# Patient Record
Sex: FEMALE | Race: ASIAN | NOT HISPANIC OR LATINO | Employment: UNEMPLOYED | URBAN - METROPOLITAN AREA
[De-identification: names, ages, dates, MRNs, and addresses within clinical notes are randomized per-mention and may not be internally consistent; named-entity substitution may affect disease eponyms.]

---

## 2018-08-11 ENCOUNTER — APPOINTMENT (OUTPATIENT)
Dept: RADIOLOGY | Facility: MEDICAL CENTER | Age: 48
DRG: 964 | End: 2018-08-11
Payer: OTHER MISCELLANEOUS

## 2018-08-11 ENCOUNTER — HOSPITAL ENCOUNTER (INPATIENT)
Facility: MEDICAL CENTER | Age: 48
LOS: 7 days | DRG: 964 | End: 2018-08-19
Attending: EMERGENCY MEDICINE | Admitting: SURGERY
Payer: OTHER MISCELLANEOUS

## 2018-08-11 ENCOUNTER — RESOLUTE PROFESSIONAL BILLING HOSPITAL PROF FEE (OUTPATIENT)
Dept: HOSPITALIST | Facility: MEDICAL CENTER | Age: 48
End: 2018-08-11
Payer: OTHER MISCELLANEOUS

## 2018-08-11 PROCEDURE — 85610 PROTHROMBIN TIME: CPT

## 2018-08-11 PROCEDURE — 84703 CHORIONIC GONADOTROPIN ASSAY: CPT

## 2018-08-11 PROCEDURE — 80053 COMPREHEN METABOLIC PANEL: CPT

## 2018-08-11 PROCEDURE — 80307 DRUG TEST PRSMV CHEM ANLYZR: CPT

## 2018-08-11 PROCEDURE — 99291 CRITICAL CARE FIRST HOUR: CPT

## 2018-08-11 PROCEDURE — 85730 THROMBOPLASTIN TIME PARTIAL: CPT

## 2018-08-11 PROCEDURE — 85027 COMPLETE CBC AUTOMATED: CPT

## 2018-08-12 ENCOUNTER — HOSPITAL ENCOUNTER (OUTPATIENT)
Dept: RADIOLOGY | Facility: MEDICAL CENTER | Age: 48
End: 2018-08-12
Attending: EMERGENCY MEDICINE

## 2018-08-12 ENCOUNTER — APPOINTMENT (OUTPATIENT)
Dept: RADIOLOGY | Facility: MEDICAL CENTER | Age: 48
DRG: 964 | End: 2018-08-12
Attending: SURGERY
Payer: OTHER MISCELLANEOUS

## 2018-08-12 PROBLEM — S27.322A BILATERAL PULMONARY CONTUSION: Status: ACTIVE | Noted: 2018-08-12

## 2018-08-12 PROBLEM — T14.90XA TRAUMA: Status: ACTIVE | Noted: 2018-08-12

## 2018-08-12 PROBLEM — S27.0XXA TRAUMATIC PNEUMOTHORAX: Status: ACTIVE | Noted: 2018-08-12

## 2018-08-12 PROBLEM — I30.9 PERICARDIAL EFFUSION, ACUTE: Status: ACTIVE | Noted: 2018-08-12

## 2018-08-12 PROBLEM — S02.2XXA CLOSED FRACTURE OF NASAL BONE: Status: ACTIVE | Noted: 2018-08-12

## 2018-08-12 PROBLEM — S36.113A LIVER INJURY, LACERATION: Status: ACTIVE | Noted: 2018-08-12

## 2018-08-12 PROBLEM — S22.41XA FRACTURE OF MULTIPLE RIBS OF RIGHT SIDE: Status: ACTIVE | Noted: 2018-08-12

## 2018-08-12 PROBLEM — Z53.09 CONTRAINDICATION TO DEEP VEIN THROMBOSIS (DVT) PROPHYLAXIS: Status: ACTIVE | Noted: 2018-08-12

## 2018-08-12 PROBLEM — I31.2: Status: ACTIVE | Noted: 2018-08-12

## 2018-08-12 PROBLEM — S27.2XXA TRAUMATIC HEMOPNEUMOTHORAX: Status: ACTIVE | Noted: 2018-08-12

## 2018-08-12 PROBLEM — S22.41XA CLOSED FRACTURE OF MULTIPLE RIBS OF RIGHT SIDE: Status: ACTIVE | Noted: 2018-08-12

## 2018-08-12 LAB
ABO GROUP BLD: NORMAL
ABO GROUP BLD: NORMAL
ALBUMIN SERPL BCP-MCNC: 3.8 G/DL (ref 3.2–4.9)
ALBUMIN SERPL BCP-MCNC: 4.4 G/DL (ref 3.2–4.9)
ALBUMIN/GLOB SERPL: 1.6 G/DL
ALBUMIN/GLOB SERPL: 1.8 G/DL
ALP SERPL-CCNC: 59 U/L (ref 30–99)
ALP SERPL-CCNC: 67 U/L (ref 30–99)
ALT SERPL-CCNC: 532 U/L (ref 2–50)
ALT SERPL-CCNC: 617 U/L (ref 2–50)
ANION GAP SERPL CALC-SCNC: 8 MMOL/L (ref 0–11.9)
ANION GAP SERPL CALC-SCNC: 9 MMOL/L (ref 0–11.9)
APTT PPP: 30 SEC (ref 24.7–36)
AST SERPL-CCNC: 444 U/L (ref 12–45)
AST SERPL-CCNC: 586 U/L (ref 12–45)
BASOPHILS # BLD AUTO: 0.3 % (ref 0–1.8)
BASOPHILS # BLD AUTO: 0.5 % (ref 0–1.8)
BASOPHILS # BLD: 0.02 K/UL (ref 0–0.12)
BASOPHILS # BLD: 0.05 K/UL (ref 0–0.12)
BILIRUB SERPL-MCNC: 0.3 MG/DL (ref 0.1–1.5)
BILIRUB SERPL-MCNC: 0.4 MG/DL (ref 0.1–1.5)
BLD GP AB SCN SERPL QL: NORMAL
BUN SERPL-MCNC: 17 MG/DL (ref 8–22)
BUN SERPL-MCNC: 19 MG/DL (ref 8–22)
CALCIUM SERPL-MCNC: 8.3 MG/DL (ref 8.5–10.5)
CALCIUM SERPL-MCNC: 8.7 MG/DL (ref 8.5–10.5)
CFT BLD TEG: 4.8 MIN (ref 5–10)
CHLORIDE SERPL-SCNC: 104 MMOL/L (ref 96–112)
CHLORIDE SERPL-SCNC: 107 MMOL/L (ref 96–112)
CLOT ANGLE BLD TEG: 67.6 DEGREES (ref 53–72)
CLOT LYSIS 30M P MA LENFR BLD TEG: 0.2 % (ref 0–8)
CO2 SERPL-SCNC: 26 MMOL/L (ref 20–33)
CO2 SERPL-SCNC: 27 MMOL/L (ref 20–33)
CREAT SERPL-MCNC: 0.6 MG/DL (ref 0.5–1.4)
CREAT SERPL-MCNC: 0.72 MG/DL (ref 0.5–1.4)
CT.EXTRINSIC BLD ROTEM: 1.7 MIN (ref 1–3)
EOSINOPHIL # BLD AUTO: 0 K/UL (ref 0–0.51)
EOSINOPHIL # BLD AUTO: 0.01 K/UL (ref 0–0.51)
EOSINOPHIL NFR BLD: 0 % (ref 0–6.9)
EOSINOPHIL NFR BLD: 0.1 % (ref 0–6.9)
ERYTHROCYTE [DISTWIDTH] IN BLOOD BY AUTOMATED COUNT: 42.8 FL (ref 35.9–50)
ERYTHROCYTE [DISTWIDTH] IN BLOOD BY AUTOMATED COUNT: 43.6 FL (ref 35.9–50)
ERYTHROCYTE [DISTWIDTH] IN BLOOD BY AUTOMATED COUNT: 44.4 FL (ref 35.9–50)
ETHANOL BLD-MCNC: 0 G/DL
GLOBULIN SER CALC-MCNC: 2.4 G/DL (ref 1.9–3.5)
GLOBULIN SER CALC-MCNC: 2.5 G/DL (ref 1.9–3.5)
GLUCOSE BLD-MCNC: 131 MG/DL (ref 65–99)
GLUCOSE BLD-MCNC: 145 MG/DL (ref 65–99)
GLUCOSE BLD-MCNC: 149 MG/DL (ref 65–99)
GLUCOSE SERPL-MCNC: 139 MG/DL (ref 65–99)
GLUCOSE SERPL-MCNC: 163 MG/DL (ref 65–99)
HCG SERPL QL: NEGATIVE
HCT VFR BLD AUTO: 34.4 % (ref 37–47)
HCT VFR BLD AUTO: 37.7 % (ref 37–47)
HCT VFR BLD AUTO: 38.3 % (ref 37–47)
HGB BLD-MCNC: 10.7 G/DL (ref 12–16)
HGB BLD-MCNC: 10.7 G/DL (ref 12–16)
HGB BLD-MCNC: 11 G/DL (ref 12–16)
HGB BLD-MCNC: 12.1 G/DL (ref 12–16)
HGB BLD-MCNC: 12.5 G/DL (ref 12–16)
IMM GRANULOCYTES # BLD AUTO: 0.03 K/UL (ref 0–0.11)
IMM GRANULOCYTES # BLD AUTO: 0.04 K/UL (ref 0–0.11)
IMM GRANULOCYTES NFR BLD AUTO: 0.4 % (ref 0–0.9)
IMM GRANULOCYTES NFR BLD AUTO: 0.4 % (ref 0–0.9)
INR PPP: 0.95 (ref 0.87–1.13)
LYMPHOCYTES # BLD AUTO: 0.65 K/UL (ref 1–4.8)
LYMPHOCYTES # BLD AUTO: 1.31 K/UL (ref 1–4.8)
LYMPHOCYTES NFR BLD: 12.2 % (ref 22–41)
LYMPHOCYTES NFR BLD: 8.3 % (ref 22–41)
MCF BLD TEG: 59.8 MM (ref 50–70)
MCH RBC QN AUTO: 29 PG (ref 27–33)
MCH RBC QN AUTO: 29.2 PG (ref 27–33)
MCH RBC QN AUTO: 29.4 PG (ref 27–33)
MCHC RBC AUTO-ENTMCNC: 32 G/DL (ref 33.6–35)
MCHC RBC AUTO-ENTMCNC: 32.1 G/DL (ref 33.6–35)
MCHC RBC AUTO-ENTMCNC: 32.6 G/DL (ref 33.6–35)
MCV RBC AUTO: 89.5 FL (ref 81.4–97.8)
MCV RBC AUTO: 90.4 FL (ref 81.4–97.8)
MCV RBC AUTO: 92 FL (ref 81.4–97.8)
MONOCYTES # BLD AUTO: 0.42 K/UL (ref 0–0.85)
MONOCYTES # BLD AUTO: 0.48 K/UL (ref 0–0.85)
MONOCYTES NFR BLD AUTO: 4.5 % (ref 0–13.4)
MONOCYTES NFR BLD AUTO: 5.4 % (ref 0–13.4)
NEUTROPHILS # BLD AUTO: 6.73 K/UL (ref 2–7.15)
NEUTROPHILS # BLD AUTO: 8.88 K/UL (ref 2–7.15)
NEUTROPHILS NFR BLD: 82.3 % (ref 44–72)
NEUTROPHILS NFR BLD: 85.6 % (ref 44–72)
NRBC # BLD AUTO: 0 K/UL
NRBC # BLD AUTO: 0 K/UL
NRBC BLD-RTO: 0 /100 WBC
NRBC BLD-RTO: 0 /100 WBC
PA AA BLD-ACNC: 46.9 %
PA ADP BLD-ACNC: 37 %
PLATELET # BLD AUTO: 202 K/UL (ref 164–446)
PLATELET # BLD AUTO: 238 K/UL (ref 164–446)
PLATELET # BLD AUTO: 238 K/UL (ref 164–446)
PMV BLD AUTO: 9 FL (ref 9–12.9)
PMV BLD AUTO: 9 FL (ref 9–12.9)
PMV BLD AUTO: 9.2 FL (ref 9–12.9)
POTASSIUM SERPL-SCNC: 3.7 MMOL/L (ref 3.6–5.5)
POTASSIUM SERPL-SCNC: 4.1 MMOL/L (ref 3.6–5.5)
PROT SERPL-MCNC: 6.2 G/DL (ref 6–8.2)
PROT SERPL-MCNC: 6.9 G/DL (ref 6–8.2)
PROTHROMBIN TIME: 12.4 SEC (ref 12–14.6)
RBC # BLD AUTO: 3.74 M/UL (ref 4.2–5.4)
RBC # BLD AUTO: 4.17 M/UL (ref 4.2–5.4)
RBC # BLD AUTO: 4.28 M/UL (ref 4.2–5.4)
RH BLD: NORMAL
RH BLD: NORMAL
SODIUM SERPL-SCNC: 140 MMOL/L (ref 135–145)
SODIUM SERPL-SCNC: 141 MMOL/L (ref 135–145)
TEG ALGORITHM TGALG: ABNORMAL
WBC # BLD AUTO: 10.2 K/UL (ref 4.8–10.8)
WBC # BLD AUTO: 10.8 K/UL (ref 4.8–10.8)
WBC # BLD AUTO: 7.9 K/UL (ref 4.8–10.8)

## 2018-08-12 PROCEDURE — 86900 BLOOD TYPING SEROLOGIC ABO: CPT

## 2018-08-12 PROCEDURE — 72128 CT CHEST SPINE W/O DYE: CPT

## 2018-08-12 PROCEDURE — 72170 X-RAY EXAM OF PELVIS: CPT

## 2018-08-12 PROCEDURE — 700102 HCHG RX REV CODE 250 W/ 637 OVERRIDE(OP): Performed by: SURGERY

## 2018-08-12 PROCEDURE — 96374 THER/PROPH/DIAG INJ IV PUSH: CPT

## 2018-08-12 PROCEDURE — 72125 CT NECK SPINE W/O DYE: CPT

## 2018-08-12 PROCEDURE — 94668 MNPJ CHEST WALL SBSQ: CPT

## 2018-08-12 PROCEDURE — 93306 TTE W/DOPPLER COMPLETE: CPT | Mod: 26 | Performed by: INTERNAL MEDICINE

## 2018-08-12 PROCEDURE — 700111 HCHG RX REV CODE 636 W/ 250 OVERRIDE (IP): Performed by: EMERGENCY MEDICINE

## 2018-08-12 PROCEDURE — 70450 CT HEAD/BRAIN W/O DYE: CPT

## 2018-08-12 PROCEDURE — 700111 HCHG RX REV CODE 636 W/ 250 OVERRIDE (IP)

## 2018-08-12 PROCEDURE — 99291 CRITICAL CARE FIRST HOUR: CPT | Performed by: SURGERY

## 2018-08-12 PROCEDURE — 85025 COMPLETE CBC W/AUTO DIFF WBC: CPT

## 2018-08-12 PROCEDURE — A9270 NON-COVERED ITEM OR SERVICE: HCPCS | Performed by: SURGERY

## 2018-08-12 PROCEDURE — 82962 GLUCOSE BLOOD TEST: CPT | Mod: 91

## 2018-08-12 PROCEDURE — 85347 COAGULATION TIME ACTIVATED: CPT

## 2018-08-12 PROCEDURE — 71045 X-RAY EXAM CHEST 1 VIEW: CPT

## 2018-08-12 PROCEDURE — 72131 CT LUMBAR SPINE W/O DYE: CPT

## 2018-08-12 PROCEDURE — 700105 HCHG RX REV CODE 258: Performed by: SURGERY

## 2018-08-12 PROCEDURE — 770022 HCHG ROOM/CARE - ICU (200)

## 2018-08-12 PROCEDURE — 700117 HCHG RX CONTRAST REV CODE 255: Performed by: EMERGENCY MEDICINE

## 2018-08-12 PROCEDURE — 700111 HCHG RX REV CODE 636 W/ 250 OVERRIDE (IP): Performed by: SURGERY

## 2018-08-12 PROCEDURE — 70486 CT MAXILLOFACIAL W/O DYE: CPT

## 2018-08-12 PROCEDURE — 700101 HCHG RX REV CODE 250: Performed by: SURGERY

## 2018-08-12 PROCEDURE — 85576 BLOOD PLATELET AGGREGATION: CPT | Mod: 91

## 2018-08-12 PROCEDURE — 51798 US URINE CAPACITY MEASURE: CPT

## 2018-08-12 PROCEDURE — 80053 COMPREHEN METABOLIC PANEL: CPT

## 2018-08-12 PROCEDURE — 86901 BLOOD TYPING SEROLOGIC RH(D): CPT

## 2018-08-12 PROCEDURE — 94667 MNPJ CHEST WALL 1ST: CPT

## 2018-08-12 PROCEDURE — 71260 CT THORAX DX C+: CPT

## 2018-08-12 PROCEDURE — 96375 TX/PRO/DX INJ NEW DRUG ADDON: CPT

## 2018-08-12 PROCEDURE — 86850 RBC ANTIBODY SCREEN: CPT

## 2018-08-12 PROCEDURE — 85384 FIBRINOGEN ACTIVITY: CPT

## 2018-08-12 PROCEDURE — 85018 HEMOGLOBIN: CPT

## 2018-08-12 RX ORDER — ONDANSETRON 2 MG/ML
4 INJECTION INTRAMUSCULAR; INTRAVENOUS EVERY 4 HOURS PRN
Status: DISCONTINUED | OUTPATIENT
Start: 2018-08-12 | End: 2018-08-19 | Stop reason: HOSPADM

## 2018-08-12 RX ORDER — BACITRACIN ZINC AND POLYMYXIN B SULFATE 500; 1000 [USP'U]/G; [USP'U]/G
OINTMENT TOPICAL 3 TIMES DAILY
Status: DISCONTINUED | OUTPATIENT
Start: 2018-08-12 | End: 2018-08-19 | Stop reason: HOSPADM

## 2018-08-12 RX ORDER — GABAPENTIN 100 MG/1
100 CAPSULE ORAL EVERY 8 HOURS
Status: DISCONTINUED | OUTPATIENT
Start: 2018-08-12 | End: 2018-08-19 | Stop reason: HOSPADM

## 2018-08-12 RX ORDER — ENEMA 19; 7 G/133ML; G/133ML
1 ENEMA RECTAL
Status: DISCONTINUED | OUTPATIENT
Start: 2018-08-12 | End: 2018-08-19 | Stop reason: HOSPADM

## 2018-08-12 RX ORDER — MORPHINE SULFATE 4 MG/ML
4 INJECTION, SOLUTION INTRAMUSCULAR; INTRAVENOUS ONCE
Status: COMPLETED | OUTPATIENT
Start: 2018-08-12 | End: 2018-08-12

## 2018-08-12 RX ORDER — SODIUM CHLORIDE, SODIUM LACTATE, POTASSIUM CHLORIDE, CALCIUM CHLORIDE 600; 310; 30; 20 MG/100ML; MG/100ML; MG/100ML; MG/100ML
INJECTION, SOLUTION INTRAVENOUS CONTINUOUS
Status: DISCONTINUED | OUTPATIENT
Start: 2018-08-12 | End: 2018-08-14

## 2018-08-12 RX ORDER — DEXTROSE MONOHYDRATE 25 G/50ML
25 INJECTION, SOLUTION INTRAVENOUS
Status: DISCONTINUED | OUTPATIENT
Start: 2018-08-12 | End: 2018-08-14

## 2018-08-12 RX ORDER — AMOXICILLIN 250 MG
1 CAPSULE ORAL NIGHTLY
Status: DISCONTINUED | OUTPATIENT
Start: 2018-08-12 | End: 2018-08-19 | Stop reason: HOSPADM

## 2018-08-12 RX ORDER — FAMOTIDINE 20 MG/1
20 TABLET, FILM COATED ORAL 2 TIMES DAILY
Status: DISCONTINUED | OUTPATIENT
Start: 2018-08-12 | End: 2018-08-12

## 2018-08-12 RX ORDER — BISACODYL 10 MG
10 SUPPOSITORY, RECTAL RECTAL
Status: DISCONTINUED | OUTPATIENT
Start: 2018-08-12 | End: 2018-08-19 | Stop reason: HOSPADM

## 2018-08-12 RX ORDER — POLYETHYLENE GLYCOL 3350 17 G/17G
1 POWDER, FOR SOLUTION ORAL 2 TIMES DAILY
Status: DISCONTINUED | OUTPATIENT
Start: 2018-08-12 | End: 2018-08-19 | Stop reason: HOSPADM

## 2018-08-12 RX ORDER — AMOXICILLIN 250 MG
1 CAPSULE ORAL
Status: DISCONTINUED | OUTPATIENT
Start: 2018-08-12 | End: 2018-08-19 | Stop reason: HOSPADM

## 2018-08-12 RX ORDER — DOCUSATE SODIUM 100 MG/1
100 CAPSULE, LIQUID FILLED ORAL 2 TIMES DAILY
Status: DISCONTINUED | OUTPATIENT
Start: 2018-08-12 | End: 2018-08-19 | Stop reason: HOSPADM

## 2018-08-12 RX ORDER — ACETAMINOPHEN 650 MG/1
650 SUPPOSITORY RECTAL EVERY 4 HOURS PRN
Status: DISCONTINUED | OUTPATIENT
Start: 2018-08-12 | End: 2018-08-12

## 2018-08-12 RX ORDER — OXYCODONE HYDROCHLORIDE 10 MG/1
10 TABLET ORAL
Status: DISCONTINUED | OUTPATIENT
Start: 2018-08-12 | End: 2018-08-19 | Stop reason: HOSPADM

## 2018-08-12 RX ORDER — ACETAMINOPHEN 325 MG/1
650 TABLET ORAL EVERY 4 HOURS PRN
Status: DISCONTINUED | OUTPATIENT
Start: 2018-08-12 | End: 2018-08-12

## 2018-08-12 RX ORDER — OXYCODONE HYDROCHLORIDE 5 MG/1
5 TABLET ORAL
Status: DISCONTINUED | OUTPATIENT
Start: 2018-08-12 | End: 2018-08-19 | Stop reason: HOSPADM

## 2018-08-12 RX ORDER — ACETAMINOPHEN 325 MG/1
650 TABLET ORAL EVERY 6 HOURS
Status: DISCONTINUED | OUTPATIENT
Start: 2018-08-12 | End: 2018-08-12

## 2018-08-12 RX ORDER — CELECOXIB 200 MG/1
200 CAPSULE ORAL 2 TIMES DAILY WITH MEALS
Status: DISCONTINUED | OUTPATIENT
Start: 2018-08-12 | End: 2018-08-12

## 2018-08-12 RX ADMIN — PROCHLORPERAZINE EDISYLATE 10 MG: 5 INJECTION INTRAMUSCULAR; INTRAVENOUS at 16:35

## 2018-08-12 RX ADMIN — MORPHINE SULFATE 4 MG: 4 INJECTION INTRAVENOUS at 01:29

## 2018-08-12 RX ADMIN — GABAPENTIN 100 MG: 100 CAPSULE ORAL at 21:10

## 2018-08-12 RX ADMIN — IOHEXOL 100 ML: 350 INJECTION, SOLUTION INTRAVENOUS at 00:49

## 2018-08-12 RX ADMIN — FENTANYL CITRATE 50 MCG: 50 INJECTION, SOLUTION INTRAMUSCULAR; INTRAVENOUS at 00:06

## 2018-08-12 RX ADMIN — SODIUM CHLORIDE, POTASSIUM CHLORIDE, SODIUM LACTATE AND CALCIUM CHLORIDE: 600; 310; 30; 20 INJECTION, SOLUTION INTRAVENOUS at 02:30

## 2018-08-12 RX ADMIN — GABAPENTIN 100 MG: 100 CAPSULE ORAL at 14:27

## 2018-08-12 RX ADMIN — ONDANSETRON 4 MG: 2 INJECTION, SOLUTION INTRAMUSCULAR; INTRAVENOUS at 15:32

## 2018-08-12 RX ADMIN — FENTANYL CITRATE 50 MCG: 50 INJECTION, SOLUTION INTRAMUSCULAR; INTRAVENOUS at 07:10

## 2018-08-12 RX ADMIN — Medication 1 TABLET: at 21:10

## 2018-08-12 RX ADMIN — ONDANSETRON 4 MG: 2 INJECTION, SOLUTION INTRAMUSCULAR; INTRAVENOUS at 08:59

## 2018-08-12 RX ADMIN — OXYCODONE HYDROCHLORIDE 5 MG: 5 TABLET ORAL at 21:10

## 2018-08-12 RX ADMIN — ONDANSETRON 4 MG: 2 INJECTION, SOLUTION INTRAMUSCULAR; INTRAVENOUS at 03:16

## 2018-08-12 RX ADMIN — SODIUM CHLORIDE, POTASSIUM CHLORIDE, SODIUM LACTATE AND CALCIUM CHLORIDE: 600; 310; 30; 20 INJECTION, SOLUTION INTRAVENOUS at 12:02

## 2018-08-12 RX ADMIN — OXYCODONE HYDROCHLORIDE 5 MG: 5 TABLET ORAL at 11:31

## 2018-08-12 RX ADMIN — OXYCODONE HYDROCHLORIDE 5 MG: 5 TABLET ORAL at 16:20

## 2018-08-12 ASSESSMENT — PAIN SCALES - GENERAL
PAINLEVEL_OUTOF10: 5
PAINLEVEL_OUTOF10: 4
PAINLEVEL_OUTOF10: 4
PAINLEVEL_OUTOF10: 5
PAINLEVEL_OUTOF10: 3
PAINLEVEL_OUTOF10: 5
PAINLEVEL_OUTOF10: 4
PAINLEVEL_OUTOF10: 4
PAINLEVEL_OUTOF10: 5
PAINLEVEL_OUTOF10: 5
PAINLEVEL_OUTOF10: 7
PAINLEVEL_OUTOF10: 4
PAINLEVEL_OUTOF10: 6
PAINLEVEL_OUTOF10: 6
PAINLEVEL_OUTOF10: 7

## 2018-08-12 ASSESSMENT — LIFESTYLE VARIABLES
EVER_SMOKED: NEVER
ALCOHOL_USE: NO

## 2018-08-12 ASSESSMENT — COPD QUESTIONNAIRES
COPD SCREENING SCORE: 1
HAVE YOU SMOKED AT LEAST 100 CIGARETTES IN YOUR ENTIRE LIFE: NO/DON'T KNOW
DURING THE PAST 4 WEEKS HOW MUCH DID YOU FEEL SHORT OF BREATH: NONE/LITTLE OF THE TIME
IN THE PAST 12 MONTHS DO YOU DO LESS THAN YOU USED TO BECAUSE OF YOUR BREATHING PROBLEMS: DISAGREE/UNSURE
HAVE YOU SMOKED AT LEAST 100 CIGARETTES IN YOUR ENTIRE LIFE: NO/DON'T KNOW
DO YOU EVER COUGH UP ANY MUCUS OR PHLEGM?: NO/ONLY WITH OCCASIONAL COLDS OR INFECTIONS
DURING THE PAST 4 WEEKS HOW MUCH DID YOU FEEL SHORT OF BREATH: NONE/LITTLE OF THE TIME
DO YOU EVER COUGH UP ANY MUCUS OR PHLEGM?: NO/ONLY WITH OCCASIONAL COLDS OR INFECTIONS

## 2018-08-12 ASSESSMENT — COGNITIVE AND FUNCTIONAL STATUS - GENERAL
SUGGESTED CMS G CODE MODIFIER DAILY ACTIVITY: CH
SUGGESTED CMS G CODE MODIFIER MOBILITY: CI
TURNING FROM BACK TO SIDE WHILE IN FLAT BAD: A LITTLE
MOBILITY SCORE: 23
DAILY ACTIVITIY SCORE: 24

## 2018-08-12 ASSESSMENT — PATIENT HEALTH QUESTIONNAIRE - PHQ9
SUM OF ALL RESPONSES TO PHQ9 QUESTIONS 1 AND 2: 0
1. LITTLE INTEREST OR PLEASURE IN DOING THINGS: NOT AT ALL
2. FEELING DOWN, DEPRESSED, IRRITABLE, OR HOPELESS: NOT AT ALL
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
## 2018-08-12 NOTE — ED NOTES
ICU MD at bedside, states that C collar is to remain on patient and C spine precautions to remain in place.
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
## 2018-08-12 NOTE — CARE PLAN
Problem: Safety  Goal: Will remain free from falls  Outcome: PROGRESSING AS EXPECTED  Pt remains free from falls throughout hospitalization - bed locked and in lowest position, treaded socks and appropriate rails in use, bed alarm on and call bell within reach. Pt rounded on hourly to address any needs.    Problem: Pain Management  Goal: Pain level will decrease to patient's comfort goal  Outcome: PROGRESSING AS EXPECTED  PRN medications in use for pain management, tolerated well by pt.
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
## 2018-08-13 ENCOUNTER — APPOINTMENT (OUTPATIENT)
Dept: RADIOLOGY | Facility: MEDICAL CENTER | Age: 48
DRG: 964 | End: 2018-08-13
Attending: SURGERY
Payer: OTHER MISCELLANEOUS

## 2018-08-13 LAB
ALBUMIN SERPL BCP-MCNC: 3.4 G/DL (ref 3.2–4.9)
ALBUMIN/GLOB SERPL: 1.6 G/DL
ALP SERPL-CCNC: 46 U/L (ref 30–99)
ALT SERPL-CCNC: 309 U/L (ref 2–50)
ANION GAP SERPL CALC-SCNC: 6 MMOL/L (ref 0–11.9)
AST SERPL-CCNC: 137 U/L (ref 12–45)
BASOPHILS # BLD AUTO: 0.5 % (ref 0–1.8)
BASOPHILS # BLD: 0.03 K/UL (ref 0–0.12)
BILIRUB SERPL-MCNC: 0.7 MG/DL (ref 0.1–1.5)
BUN SERPL-MCNC: 10 MG/DL (ref 8–22)
CALCIUM SERPL-MCNC: 8.1 MG/DL (ref 8.5–10.5)
CHLORIDE SERPL-SCNC: 107 MMOL/L (ref 96–112)
CO2 SERPL-SCNC: 26 MMOL/L (ref 20–33)
CREAT SERPL-MCNC: 0.48 MG/DL (ref 0.5–1.4)
EOSINOPHIL # BLD AUTO: 0.02 K/UL (ref 0–0.51)
EOSINOPHIL NFR BLD: 0.3 % (ref 0–6.9)
ERYTHROCYTE [DISTWIDTH] IN BLOOD BY AUTOMATED COUNT: 45.1 FL (ref 35.9–50)
GLOBULIN SER CALC-MCNC: 2.1 G/DL (ref 1.9–3.5)
GLUCOSE BLD-MCNC: 110 MG/DL (ref 65–99)
GLUCOSE BLD-MCNC: 114 MG/DL (ref 65–99)
GLUCOSE BLD-MCNC: 120 MG/DL (ref 65–99)
GLUCOSE BLD-MCNC: 123 MG/DL (ref 65–99)
GLUCOSE SERPL-MCNC: 129 MG/DL (ref 65–99)
HCT VFR BLD AUTO: 31 % (ref 37–47)
HGB BLD-MCNC: 10.1 G/DL (ref 12–16)
IMM GRANULOCYTES # BLD AUTO: 0.02 K/UL (ref 0–0.11)
IMM GRANULOCYTES NFR BLD AUTO: 0.3 % (ref 0–0.9)
LV EJECT FRACT  99904: 70
LV EJECT FRACT MOD 2C 99903: 74.81
LV EJECT FRACT MOD 4C 99902: 82.54
LV EJECT FRACT MOD BP 99901: 77.91
LYMPHOCYTES # BLD AUTO: 1.14 K/UL (ref 1–4.8)
LYMPHOCYTES NFR BLD: 19.9 % (ref 22–41)
MAGNESIUM SERPL-MCNC: 1.9 MG/DL (ref 1.5–2.5)
MCH RBC QN AUTO: 30 PG (ref 27–33)
MCHC RBC AUTO-ENTMCNC: 32.6 G/DL (ref 33.6–35)
MCV RBC AUTO: 92 FL (ref 81.4–97.8)
MONOCYTES # BLD AUTO: 0.39 K/UL (ref 0–0.85)
MONOCYTES NFR BLD AUTO: 6.8 % (ref 0–13.4)
NEUTROPHILS # BLD AUTO: 4.14 K/UL (ref 2–7.15)
NEUTROPHILS NFR BLD: 72.2 % (ref 44–72)
NRBC # BLD AUTO: 0 K/UL
NRBC BLD-RTO: 0 /100 WBC
PHOSPHATE SERPL-MCNC: 3 MG/DL (ref 2.5–4.5)
PLATELET # BLD AUTO: 153 K/UL (ref 164–446)
PMV BLD AUTO: 9.4 FL (ref 9–12.9)
POTASSIUM SERPL-SCNC: 4 MMOL/L (ref 3.6–5.5)
PROT SERPL-MCNC: 5.5 G/DL (ref 6–8.2)
RBC # BLD AUTO: 3.37 M/UL (ref 4.2–5.4)
SODIUM SERPL-SCNC: 139 MMOL/L (ref 135–145)
WBC # BLD AUTO: 5.7 K/UL (ref 4.8–10.8)

## 2018-08-13 PROCEDURE — 99291 CRITICAL CARE FIRST HOUR: CPT | Performed by: SURGERY

## 2018-08-13 PROCEDURE — 770022 HCHG ROOM/CARE - ICU (200)

## 2018-08-13 PROCEDURE — 83735 ASSAY OF MAGNESIUM: CPT

## 2018-08-13 PROCEDURE — A9270 NON-COVERED ITEM OR SERVICE: HCPCS | Performed by: SURGERY

## 2018-08-13 PROCEDURE — 700105 HCHG RX REV CODE 258: Performed by: SURGERY

## 2018-08-13 PROCEDURE — 700112 HCHG RX REV CODE 229: Performed by: SURGERY

## 2018-08-13 PROCEDURE — 93306 TTE W/DOPPLER COMPLETE: CPT

## 2018-08-13 PROCEDURE — 85025 COMPLETE CBC W/AUTO DIFF WBC: CPT

## 2018-08-13 PROCEDURE — 71045 X-RAY EXAM CHEST 1 VIEW: CPT

## 2018-08-13 PROCEDURE — 93970 EXTREMITY STUDY: CPT | Mod: 26 | Performed by: SURGERY

## 2018-08-13 PROCEDURE — 94668 MNPJ CHEST WALL SBSQ: CPT

## 2018-08-13 PROCEDURE — 700102 HCHG RX REV CODE 250 W/ 637 OVERRIDE(OP): Performed by: SURGERY

## 2018-08-13 PROCEDURE — 84100 ASSAY OF PHOSPHORUS: CPT

## 2018-08-13 PROCEDURE — 82962 GLUCOSE BLOOD TEST: CPT

## 2018-08-13 PROCEDURE — 700101 HCHG RX REV CODE 250: Performed by: SURGERY

## 2018-08-13 PROCEDURE — 80053 COMPREHEN METABOLIC PANEL: CPT

## 2018-08-13 PROCEDURE — 93971 EXTREMITY STUDY: CPT

## 2018-08-13 RX ORDER — ACETAMINOPHEN 325 MG/1
650 TABLET ORAL EVERY 6 HOURS
Status: DISCONTINUED | OUTPATIENT
Start: 2018-08-13 | End: 2018-08-19 | Stop reason: HOSPADM

## 2018-08-13 RX ADMIN — ACETAMINOPHEN 650 MG: 325 TABLET, FILM COATED ORAL at 13:20

## 2018-08-13 RX ADMIN — Medication: at 18:00

## 2018-08-13 RX ADMIN — Medication 1 TABLET: at 21:30

## 2018-08-13 RX ADMIN — GABAPENTIN 100 MG: 100 CAPSULE ORAL at 13:19

## 2018-08-13 RX ADMIN — DOCUSATE SODIUM 100 MG: 100 CAPSULE, LIQUID FILLED ORAL at 05:16

## 2018-08-13 RX ADMIN — OXYCODONE HYDROCHLORIDE 5 MG: 5 TABLET ORAL at 09:01

## 2018-08-13 RX ADMIN — GABAPENTIN 100 MG: 100 CAPSULE ORAL at 23:23

## 2018-08-13 RX ADMIN — Medication 1 EACH: at 05:16

## 2018-08-13 RX ADMIN — ACETAMINOPHEN 650 MG: 325 TABLET, FILM COATED ORAL at 17:59

## 2018-08-13 RX ADMIN — OXYCODONE HYDROCHLORIDE 5 MG: 5 TABLET ORAL at 05:16

## 2018-08-13 RX ADMIN — SODIUM CHLORIDE, POTASSIUM CHLORIDE, SODIUM LACTATE AND CALCIUM CHLORIDE: 600; 310; 30; 20 INJECTION, SOLUTION INTRAVENOUS at 07:56

## 2018-08-13 RX ADMIN — Medication 1 EACH: at 13:20

## 2018-08-13 RX ADMIN — OXYCODONE HYDROCHLORIDE 5 MG: 5 TABLET ORAL at 17:59

## 2018-08-13 RX ADMIN — GABAPENTIN 100 MG: 100 CAPSULE ORAL at 05:16

## 2018-08-13 RX ADMIN — ACETAMINOPHEN 650 MG: 325 TABLET, FILM COATED ORAL at 23:24

## 2018-08-13 ASSESSMENT — PAIN SCALES - GENERAL
PAINLEVEL_OUTOF10: 5
PAINLEVEL_OUTOF10: 5
PAINLEVEL_OUTOF10: 3
PAINLEVEL_OUTOF10: 5
PAINLEVEL_OUTOF10: 3

## 2018-08-13 ASSESSMENT — PATIENT HEALTH QUESTIONNAIRE - PHQ9
1. LITTLE INTEREST OR PLEASURE IN DOING THINGS: NOT AT ALL
2. FEELING DOWN, DEPRESSED, IRRITABLE, OR HOPELESS: NOT AT ALL
SUM OF ALL RESPONSES TO PHQ9 QUESTIONS 1 AND 2: 0

## 2018-08-13 NOTE — CARE PLAN
Problem: Safety  Goal: Will remain free from injury  Bed in low and locked position. All alarm set appropriately, on and running.    Problem: Pain Management  Goal: Pain level will decrease to patient's comfort goal  Will continue to monitor pain throughout this shift. PRN meds to be given if needed    Problem: Skin Integrity  Goal: Risk for impaired skin integrity will decrease  Turn q2h to prevent skin breakdown.  Skin assessed.
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
## 2018-08-14 ENCOUNTER — APPOINTMENT (OUTPATIENT)
Dept: RADIOLOGY | Facility: MEDICAL CENTER | Age: 48
DRG: 964 | End: 2018-08-14
Attending: SURGERY
Payer: OTHER MISCELLANEOUS

## 2018-08-14 LAB
ALBUMIN SERPL BCP-MCNC: 3.3 G/DL (ref 3.2–4.9)
ALBUMIN/GLOB SERPL: 1.2 G/DL
ALP SERPL-CCNC: 49 U/L (ref 30–99)
ALT SERPL-CCNC: 206 U/L (ref 2–50)
ANION GAP SERPL CALC-SCNC: 4 MMOL/L (ref 0–11.9)
AST SERPL-CCNC: 68 U/L (ref 12–45)
BASOPHILS # BLD AUTO: 0.5 % (ref 0–1.8)
BASOPHILS # BLD: 0.03 K/UL (ref 0–0.12)
BILIRUB SERPL-MCNC: 0.8 MG/DL (ref 0.1–1.5)
BUN SERPL-MCNC: 9 MG/DL (ref 8–22)
CALCIUM SERPL-MCNC: 8.3 MG/DL (ref 8.5–10.5)
CHLORIDE SERPL-SCNC: 102 MMOL/L (ref 96–112)
CO2 SERPL-SCNC: 31 MMOL/L (ref 20–33)
CREAT SERPL-MCNC: 0.51 MG/DL (ref 0.5–1.4)
EOSINOPHIL # BLD AUTO: 0.03 K/UL (ref 0–0.51)
EOSINOPHIL NFR BLD: 0.5 % (ref 0–6.9)
ERYTHROCYTE [DISTWIDTH] IN BLOOD BY AUTOMATED COUNT: 43.5 FL (ref 35.9–50)
GLOBULIN SER CALC-MCNC: 2.7 G/DL (ref 1.9–3.5)
GLUCOSE BLD-MCNC: 107 MG/DL (ref 65–99)
GLUCOSE SERPL-MCNC: 108 MG/DL (ref 65–99)
HCT VFR BLD AUTO: 30.8 % (ref 37–47)
HGB BLD-MCNC: 10.1 G/DL (ref 12–16)
HGB RETIC QN AUTO: 31.1 PG/CELL (ref 29–35)
IMM GRANULOCYTES # BLD AUTO: 0.01 K/UL (ref 0–0.11)
IMM GRANULOCYTES NFR BLD AUTO: 0.2 % (ref 0–0.9)
IMM RETICS NFR: 6.3 % (ref 9.3–17.4)
IRON SATN MFR SERPL: 12 % (ref 15–55)
IRON SERPL-MCNC: 31 UG/DL (ref 40–170)
LYMPHOCYTES # BLD AUTO: 1.45 K/UL (ref 1–4.8)
LYMPHOCYTES NFR BLD: 25.3 % (ref 22–41)
MCH RBC QN AUTO: 29.8 PG (ref 27–33)
MCHC RBC AUTO-ENTMCNC: 32.8 G/DL (ref 33.6–35)
MCV RBC AUTO: 90.9 FL (ref 81.4–97.8)
MONOCYTES # BLD AUTO: 0.36 K/UL (ref 0–0.85)
MONOCYTES NFR BLD AUTO: 6.3 % (ref 0–13.4)
NEUTROPHILS # BLD AUTO: 3.84 K/UL (ref 2–7.15)
NEUTROPHILS NFR BLD: 67.2 % (ref 44–72)
NRBC # BLD AUTO: 0 K/UL
NRBC BLD-RTO: 0 /100 WBC
PLATELET # BLD AUTO: 164 K/UL (ref 164–446)
PMV BLD AUTO: 9.6 FL (ref 9–12.9)
POTASSIUM SERPL-SCNC: 4 MMOL/L (ref 3.6–5.5)
PROT SERPL-MCNC: 6 G/DL (ref 6–8.2)
RBC # BLD AUTO: 3.39 M/UL (ref 4.2–5.4)
RETICS # AUTO: 0.04 M/UL (ref 0.04–0.06)
RETICS/RBC NFR: 1.1 % (ref 0.8–2.1)
SODIUM SERPL-SCNC: 137 MMOL/L (ref 135–145)
TIBC SERPL-MCNC: 260 UG/DL (ref 250–450)
WBC # BLD AUTO: 5.7 K/UL (ref 4.8–10.8)

## 2018-08-14 PROCEDURE — 83550 IRON BINDING TEST: CPT

## 2018-08-14 PROCEDURE — A9270 NON-COVERED ITEM OR SERVICE: HCPCS | Performed by: SURGERY

## 2018-08-14 PROCEDURE — 85046 RETICYTE/HGB CONCENTRATE: CPT

## 2018-08-14 PROCEDURE — 71045 X-RAY EXAM CHEST 1 VIEW: CPT

## 2018-08-14 PROCEDURE — 700102 HCHG RX REV CODE 250 W/ 637 OVERRIDE(OP): Performed by: SURGERY

## 2018-08-14 PROCEDURE — 700111 HCHG RX REV CODE 636 W/ 250 OVERRIDE (IP): Performed by: SURGERY

## 2018-08-14 PROCEDURE — 83540 ASSAY OF IRON: CPT

## 2018-08-14 PROCEDURE — 82962 GLUCOSE BLOOD TEST: CPT

## 2018-08-14 PROCEDURE — 700112 HCHG RX REV CODE 229: Performed by: SURGERY

## 2018-08-14 PROCEDURE — 94668 MNPJ CHEST WALL SBSQ: CPT

## 2018-08-14 PROCEDURE — 99291 CRITICAL CARE FIRST HOUR: CPT | Performed by: SURGERY

## 2018-08-14 PROCEDURE — 80053 COMPREHEN METABOLIC PANEL: CPT

## 2018-08-14 PROCEDURE — 700101 HCHG RX REV CODE 250: Performed by: SURGERY

## 2018-08-14 PROCEDURE — 770001 HCHG ROOM/CARE - MED/SURG/GYN PRIV*

## 2018-08-14 PROCEDURE — 85025 COMPLETE CBC W/AUTO DIFF WBC: CPT

## 2018-08-14 RX ADMIN — ONDANSETRON 4 MG: 2 INJECTION, SOLUTION INTRAMUSCULAR; INTRAVENOUS at 23:07

## 2018-08-14 RX ADMIN — DOCUSATE SODIUM 100 MG: 100 CAPSULE, LIQUID FILLED ORAL at 05:28

## 2018-08-14 RX ADMIN — GABAPENTIN 100 MG: 100 CAPSULE ORAL at 05:28

## 2018-08-14 RX ADMIN — Medication 1 EACH: at 05:37

## 2018-08-14 RX ADMIN — Medication 1 EACH: at 11:51

## 2018-08-14 RX ADMIN — ACETAMINOPHEN 650 MG: 325 TABLET, FILM COATED ORAL at 17:32

## 2018-08-14 RX ADMIN — POLYETHYLENE GLYCOL 3350 1 PACKET: 17 POWDER, FOR SOLUTION ORAL at 17:33

## 2018-08-14 RX ADMIN — OXYCODONE HYDROCHLORIDE 5 MG: 5 TABLET ORAL at 21:33

## 2018-08-14 RX ADMIN — DOCUSATE SODIUM 100 MG: 100 CAPSULE, LIQUID FILLED ORAL at 17:32

## 2018-08-14 RX ADMIN — Medication 1 EACH: at 17:33

## 2018-08-14 RX ADMIN — GABAPENTIN 100 MG: 100 CAPSULE ORAL at 13:59

## 2018-08-14 RX ADMIN — GABAPENTIN 100 MG: 100 CAPSULE ORAL at 21:33

## 2018-08-14 RX ADMIN — ACETAMINOPHEN 650 MG: 325 TABLET, FILM COATED ORAL at 11:51

## 2018-08-14 RX ADMIN — ENOXAPARIN SODIUM 30 MG: 100 INJECTION SUBCUTANEOUS at 17:32

## 2018-08-14 RX ADMIN — POLYETHYLENE GLYCOL 3350 1 PACKET: 17 POWDER, FOR SOLUTION ORAL at 05:33

## 2018-08-14 RX ADMIN — IRON SUCROSE 200 MG: 20 INJECTION, SOLUTION INTRAVENOUS at 14:00

## 2018-08-14 RX ADMIN — ACETAMINOPHEN 650 MG: 325 TABLET, FILM COATED ORAL at 05:28

## 2018-08-14 RX ADMIN — Medication 1 TABLET: at 21:33

## 2018-08-14 RX ADMIN — ENOXAPARIN SODIUM 30 MG: 100 INJECTION SUBCUTANEOUS at 05:33

## 2018-08-14 RX ADMIN — OXYCODONE HYDROCHLORIDE 2.5 MG: 5 TABLET ORAL at 11:50

## 2018-08-14 ASSESSMENT — PAIN SCALES - GENERAL
PAINLEVEL_OUTOF10: 2
PAINLEVEL_OUTOF10: 3
PAINLEVEL_OUTOF10: 2
PAINLEVEL_OUTOF10: 6
PAINLEVEL_OUTOF10: 4
PAINLEVEL_OUTOF10: 5
PAINLEVEL_OUTOF10: 4
PAINLEVEL_OUTOF10: 4
PAINLEVEL_OUTOF10: 5
PAINLEVEL_OUTOF10: 6
PAINLEVEL_OUTOF10: 4
PAINLEVEL_OUTOF10: 4
PAINLEVEL_OUTOF10: 2

## 2018-08-14 NOTE — CARE PLAN
Problem: Venous Thromboembolism (VTW)/Deep Vein Thrombosis (DVT) Prevention:  Goal: Patient will participate in Venous Thrombosis (VTE)/Deep Vein Thrombosis (DVT)Prevention Measures    Intervention: Ensure patient wears graduated elastic stockings (FISH hose) and/or SCDs, if ordered, when in bed or chair (Remove at least once per shift for skin check)  No s/s of DVT, receiving Lovenox,  has sequentials on and ambulates to commode.        Problem: Respiratory:  Goal: Respiratory status will improve    Intervention: Educate and encourage coughing and deep breathing  IS at bedside, education provided for IS, coughing and deep breathing. IS at 800-1000, education provided to use 10/hr.

## 2018-08-14 NOTE — PROGRESS NOTES
IV Iron Per Pharmacy Note    Reason for Iron Replacement: Iron deficiency anemia      Lab Results   Component Value Date/Time    WBC 5.7 08/14/2018 05:30 AM    RBC 3.39 (L) 08/14/2018 05:30 AM    HEMOGLOBIN 10.1 (L) 08/14/2018 05:30 AM    HEMATOCRIT 30.8 (L) 08/14/2018 05:30 AM    MCV 90.9 08/14/2018 05:30 AM    MCH 29.8 08/14/2018 05:30 AM    MCHC 32.8 (L) 08/14/2018 05:30 AM    MPV 9.6 08/14/2018 05:30 AM       Recent Labs      08/14/18   0530   IRON  31*         Recent Labs      08/14/18   0530   CREATININE  0.51          Assessment/Plan:  1. IV Iron Indicated.   2. Give Iron Sucrose 200mg IV daily for 5 days to complete iron load.     Ronan Dyson, PharmD

## 2018-08-14 NOTE — DISCHARGE PLANNING
Care Transition Team Assessment    LSW spoke with pt's  (Kapil) over the phone and obtained the information used in this assessment. Pt verified accuracy of facesheet. Pt lives in an apartment with her  on the first floor.  Pt does not have a pharmacy. Prior to current hospitalization, pt was completely independent in ADLS/IADLS. Pt drives and is able to attend necessary MD appointments. Pt is employed full time and has no financial concerns. Pt has good support system. Pt denies any hx of substance use and denies any dx of mh.     Pt's  updated this LSW that he us already on the road  and will be gone until Friday 8/24 and that he has the keys to their home. LSW stated that if pt were too d/c prior to Friday, we would call and update him so an alternative plan can be made.    Information Source  Orientation : Oriented x 4  Information Given By: Spouse  Informant's Name:  (Kapil)  Who is responsible for making decisions for patient? : Patient    Readmission Evaluation  Is this a readmission?: No    Elopement Risk  Legal Hold: No  Ambulatory or Self Mobile in Wheelchair: No-Not an Elopement Risk  Elopement Risk: Not at Risk for Elopement    Interdisciplinary Discharge Planning  Patient or legal guardian wants to designate a caregiver (see row info): No    Discharge Preparedness  What is your plan after discharge?: Uncertain - pending medical team collaboration, Home with help, Home health care  What are your discharge supports?: Spouse  Prior Functional Level: Ambulatory, Drives Self, Independent with Activities of Daily Living, Independent with Medication Management  Difficulity with ADLs: Bathing, Dressing, Walking  Difficulity with IADLs: Cooking, Driving, Laundry, Shopping    Functional Assesment  Prior Functional Level: Ambulatory, Drives Self, Independent with Activities of Daily Living, Independent with Medication Management    Finances  Financial Barriers to Discharge:  No  Prescription Coverage: Yes    Vision / Hearing Impairment  Vision Impairment : No  Hearing Impairment : No    Values / Beliefs / Concerns  Values / Beliefs Concerns : No (pt in [facility])    Advance Directive  Advance Directive?: None    Domestic Abuse  Have you ever been the victim of abuse or violence?: No  Physical Abuse or Sexual Abuse: No  Verbal Abuse or Emotional Abuse: No  Possible Abuse Reported to:: Not Applicable    Psychological Assessment  History of Substance Abuse: None  History of Psychiatric Problems: No  Non-compliant with Treatment: No  Newly Diagnosed Illness: Yes    Discharge Risks or Barriers  Discharge risks or barriers?: Uninsured / underinsured, Post-acute placement / services  Patient risk factors: Complex medical needs, Language barrier, Uninsured or underinsured    Anticipated Discharge Information  Anticipated discharge disposition: Acute rehab, Discharge needs currently unknown, DME, HHC, SNF
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
## 2018-08-14 NOTE — DISCHARGE PLANNING
Anticipated Discharge Disposition: SNF vs Rehab    Action: LSW received call from Keila ROBLES CM with [employer] [phone] who is pt's insurance provider. Keila stated that pt has a [place] insurance and [facility] is out of network and she was wondering if pt would like to be brought back to [place]. LSW explained that she believed pt was a resident of [place] but would verify that information. LSW called and spoke to pt's  Kapil who stated that pt has a couple stores in [place] but that pt lives in [place] full time and does not want to return. LSW explained about [place] insurance and Kapil stated that he is working on changing that. LSW called Keila back and left  to provide update on pt's POC.    Barriers to Discharge: Out of network insurance     Plan: Waiting returned call from Keila ROBLES CM.

## 2018-08-14 NOTE — CARE PLAN
Problem: Safety  Goal: Will remain free from falls  Outcome: PROGRESSING AS EXPECTED  Patient bed in low, locked position with side rails up x2. Patient wearing treaded slipper socks. Call bell and personal belongings in reach. Patient uses call bed appropriately.     Problem: Pain Management  Goal: Pain level will decrease to patient's comfort goal  Outcome: PROGRESSING AS EXPECTED  Assessing pain q2hrs. Assisting patient to rest and reposition for comfort. Providing pain meds per MAR as needed.

## 2018-08-14 NOTE — CARE PLAN
Problem: Hyperinflation:  Goal: Prevent or improve atelectasis    Intervention: Instruct incentive spirometry usage  PEP QID  60% 1560  Best Is value today 1000

## 2018-08-15 ENCOUNTER — APPOINTMENT (OUTPATIENT)
Dept: RADIOLOGY | Facility: MEDICAL CENTER | Age: 48
DRG: 964 | End: 2018-08-15
Attending: SURGERY
Payer: OTHER MISCELLANEOUS

## 2018-08-15 LAB
ALBUMIN SERPL BCP-MCNC: 3.3 G/DL (ref 3.2–4.9)
ALBUMIN/GLOB SERPL: 1.2 G/DL
ALP SERPL-CCNC: 59 U/L (ref 30–99)
ALT SERPL-CCNC: 165 U/L (ref 2–50)
ANION GAP SERPL CALC-SCNC: 5 MMOL/L (ref 0–11.9)
AST SERPL-CCNC: 55 U/L (ref 12–45)
BASOPHILS # BLD AUTO: 1.3 % (ref 0–1.8)
BASOPHILS # BLD: 0.06 K/UL (ref 0–0.12)
BILIRUB SERPL-MCNC: 0.5 MG/DL (ref 0.1–1.5)
BUN SERPL-MCNC: 10 MG/DL (ref 8–22)
CALCIUM SERPL-MCNC: 8.6 MG/DL (ref 8.5–10.5)
CHLORIDE SERPL-SCNC: 103 MMOL/L (ref 96–112)
CO2 SERPL-SCNC: 30 MMOL/L (ref 20–33)
CREAT SERPL-MCNC: 0.6 MG/DL (ref 0.5–1.4)
EOSINOPHIL # BLD AUTO: 0.03 K/UL (ref 0–0.51)
EOSINOPHIL NFR BLD: 0.6 % (ref 0–6.9)
ERYTHROCYTE [DISTWIDTH] IN BLOOD BY AUTOMATED COUNT: 42.5 FL (ref 35.9–50)
GLOBULIN SER CALC-MCNC: 2.7 G/DL (ref 1.9–3.5)
GLUCOSE SERPL-MCNC: 112 MG/DL (ref 65–99)
HCT VFR BLD AUTO: 31.2 % (ref 37–47)
HGB BLD-MCNC: 10.2 G/DL (ref 12–16)
IMM GRANULOCYTES # BLD AUTO: 0.01 K/UL (ref 0–0.11)
IMM GRANULOCYTES NFR BLD AUTO: 0.2 % (ref 0–0.9)
LYMPHOCYTES # BLD AUTO: 1.36 K/UL (ref 1–4.8)
LYMPHOCYTES NFR BLD: 29.2 % (ref 22–41)
MCH RBC QN AUTO: 29.7 PG (ref 27–33)
MCHC RBC AUTO-ENTMCNC: 32.7 G/DL (ref 33.6–35)
MCV RBC AUTO: 91 FL (ref 81.4–97.8)
MONOCYTES # BLD AUTO: 0.3 K/UL (ref 0–0.85)
MONOCYTES NFR BLD AUTO: 6.5 % (ref 0–13.4)
NEUTROPHILS # BLD AUTO: 2.89 K/UL (ref 2–7.15)
NEUTROPHILS NFR BLD: 62.2 % (ref 44–72)
NRBC # BLD AUTO: 0 K/UL
NRBC BLD-RTO: 0 /100 WBC
PLATELET # BLD AUTO: 175 K/UL (ref 164–446)
PMV BLD AUTO: 9.4 FL (ref 9–12.9)
POTASSIUM SERPL-SCNC: 4 MMOL/L (ref 3.6–5.5)
PROT SERPL-MCNC: 6 G/DL (ref 6–8.2)
RBC # BLD AUTO: 3.43 M/UL (ref 4.2–5.4)
SODIUM SERPL-SCNC: 138 MMOL/L (ref 135–145)
WBC # BLD AUTO: 4.7 K/UL (ref 4.8–10.8)

## 2018-08-15 PROCEDURE — 80053 COMPREHEN METABOLIC PANEL: CPT

## 2018-08-15 PROCEDURE — 700101 HCHG RX REV CODE 250: Performed by: SURGERY

## 2018-08-15 PROCEDURE — 770001 HCHG ROOM/CARE - MED/SURG/GYN PRIV*

## 2018-08-15 PROCEDURE — G8978 MOBILITY CURRENT STATUS: HCPCS | Mod: CI

## 2018-08-15 PROCEDURE — 97161 PT EVAL LOW COMPLEX 20 MIN: CPT

## 2018-08-15 PROCEDURE — G8979 MOBILITY GOAL STATUS: HCPCS | Mod: CI

## 2018-08-15 PROCEDURE — G8980 MOBILITY D/C STATUS: HCPCS | Mod: CI

## 2018-08-15 PROCEDURE — 700111 HCHG RX REV CODE 636 W/ 250 OVERRIDE (IP): Performed by: SURGERY

## 2018-08-15 PROCEDURE — 71045 X-RAY EXAM CHEST 1 VIEW: CPT

## 2018-08-15 PROCEDURE — 700102 HCHG RX REV CODE 250 W/ 637 OVERRIDE(OP): Performed by: SURGERY

## 2018-08-15 PROCEDURE — 85025 COMPLETE CBC W/AUTO DIFF WBC: CPT

## 2018-08-15 PROCEDURE — A9270 NON-COVERED ITEM OR SERVICE: HCPCS | Performed by: SURGERY

## 2018-08-15 PROCEDURE — 99233 SBSQ HOSP IP/OBS HIGH 50: CPT | Performed by: SURGERY

## 2018-08-15 PROCEDURE — 700112 HCHG RX REV CODE 229: Performed by: SURGERY

## 2018-08-15 RX ADMIN — ACETAMINOPHEN 650 MG: 325 TABLET, FILM COATED ORAL at 13:00

## 2018-08-15 RX ADMIN — POLYETHYLENE GLYCOL 3350 1 PACKET: 17 POWDER, FOR SOLUTION ORAL at 05:22

## 2018-08-15 RX ADMIN — ENOXAPARIN SODIUM 30 MG: 100 INJECTION SUBCUTANEOUS at 05:22

## 2018-08-15 RX ADMIN — ACETAMINOPHEN 650 MG: 325 TABLET, FILM COATED ORAL at 17:08

## 2018-08-15 RX ADMIN — GABAPENTIN 100 MG: 100 CAPSULE ORAL at 05:22

## 2018-08-15 RX ADMIN — Medication 1 EACH: at 13:00

## 2018-08-15 RX ADMIN — ENOXAPARIN SODIUM 30 MG: 100 INJECTION SUBCUTANEOUS at 17:08

## 2018-08-15 RX ADMIN — GABAPENTIN 100 MG: 100 CAPSULE ORAL at 22:25

## 2018-08-15 RX ADMIN — IRON SUCROSE 200 MG: 20 INJECTION, SOLUTION INTRAVENOUS at 07:18

## 2018-08-15 RX ADMIN — ACETAMINOPHEN 650 MG: 325 TABLET, FILM COATED ORAL at 22:25

## 2018-08-15 RX ADMIN — MAGNESIUM HYDROXIDE 30 ML: 400 SUSPENSION ORAL at 05:23

## 2018-08-15 RX ADMIN — Medication 1 EACH: at 05:22

## 2018-08-15 RX ADMIN — ACETAMINOPHEN 650 MG: 325 TABLET, FILM COATED ORAL at 05:22

## 2018-08-15 RX ADMIN — GABAPENTIN 100 MG: 100 CAPSULE ORAL at 13:39

## 2018-08-15 RX ADMIN — Medication 1 EACH: at 17:09

## 2018-08-15 RX ADMIN — DOCUSATE SODIUM 100 MG: 100 CAPSULE, LIQUID FILLED ORAL at 05:22

## 2018-08-15 ASSESSMENT — COGNITIVE AND FUNCTIONAL STATUS - GENERAL
MOVING TO AND FROM BED TO CHAIR: A LITTLE
TURNING FROM BACK TO SIDE WHILE IN FLAT BAD: A LITTLE
MOBILITY SCORE: 22
SUGGESTED CMS G CODE MODIFIER MOBILITY: CJ

## 2018-08-15 ASSESSMENT — PAIN SCALES - GENERAL
PAINLEVEL_OUTOF10: 3
PAINLEVEL_OUTOF10: 2
PAINLEVEL_OUTOF10: 3
PAINLEVEL_OUTOF10: 2
PAINLEVEL_OUTOF10: 3
PAINLEVEL_OUTOF10: 3
PAINLEVEL_OUTOF10: 2
PAINLEVEL_OUTOF10: 3

## 2018-08-15 ASSESSMENT — GAIT ASSESSMENTS
GAIT LEVEL OF ASSIST: SUPERVISED
DISTANCE (FEET): 300
DEVIATION: BRADYKINETIC

## 2018-08-15 ASSESSMENT — ENCOUNTER SYMPTOMS
MYALGIAS: 0
SPEECH CHANGE: 0
SHORTNESS OF BREATH: 1
FEVER: 0
NAUSEA: 0
ABDOMINAL PAIN: 0
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
## 2018-08-15 NOTE — CARE PLAN
Problem: Hyperinflation:  Goal: Prevent or improve atelectasis    Intervention: Instruct incentive spirometry usage  IS QID   [id number] on IS
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
## 2018-08-15 NOTE — CARE PLAN
Problem: Hyperinflation:  Goal: Prevent or improve atelectasis  Patient is on PEP therapy IS values at 1250.
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
## 2018-08-15 NOTE — CARE PLAN
Problem: Infection  Goal: Will remain free from infection    Intervention: Implement standard precautions and perform hand washing before and after patient contact  Hand hygiene and standard precautions educated to pt and implemented. RN monitors Pt VS and lab values to observe for S/S of infection.        Problem: Pain Management  Goal: Pain level will decrease to patient's comfort goal    Intervention: Follow pain managment plan developed in collaboration with patient and Interdisciplinary Team  Assessing pain level frequently using 0 to 10 scale.  Providing medication per MAR.  Providing non-pharmacological intervention and therapeutic communication.

## 2018-08-16 ENCOUNTER — APPOINTMENT (OUTPATIENT)
Dept: RADIOLOGY | Facility: MEDICAL CENTER | Age: 48
DRG: 964 | End: 2018-08-16
Attending: SURGERY
Payer: OTHER MISCELLANEOUS

## 2018-08-16 LAB
ALBUMIN SERPL BCP-MCNC: 3.4 G/DL (ref 3.2–4.9)
ALBUMIN/GLOB SERPL: 1.1 G/DL
ALP SERPL-CCNC: 70 U/L (ref 30–99)
ALT SERPL-CCNC: 142 U/L (ref 2–50)
ANION GAP SERPL CALC-SCNC: 6 MMOL/L (ref 0–11.9)
AST SERPL-CCNC: 48 U/L (ref 12–45)
BASOPHILS # BLD AUTO: 1.8 % (ref 0–1.8)
BASOPHILS # BLD: 0.09 K/UL (ref 0–0.12)
BILIRUB SERPL-MCNC: 0.5 MG/DL (ref 0.1–1.5)
BUN SERPL-MCNC: 9 MG/DL (ref 8–22)
CALCIUM SERPL-MCNC: 8.7 MG/DL (ref 8.5–10.5)
CHLORIDE SERPL-SCNC: 103 MMOL/L (ref 96–112)
CO2 SERPL-SCNC: 29 MMOL/L (ref 20–33)
CREAT SERPL-MCNC: 0.49 MG/DL (ref 0.5–1.4)
EOSINOPHIL # BLD AUTO: 0.06 K/UL (ref 0–0.51)
EOSINOPHIL NFR BLD: 1.2 % (ref 0–6.9)
ERYTHROCYTE [DISTWIDTH] IN BLOOD BY AUTOMATED COUNT: 42.5 FL (ref 35.9–50)
GLOBULIN SER CALC-MCNC: 3 G/DL (ref 1.9–3.5)
GLUCOSE SERPL-MCNC: 113 MG/DL (ref 65–99)
HCT VFR BLD AUTO: 33 % (ref 37–47)
HGB BLD-MCNC: 10.9 G/DL (ref 12–16)
IMM GRANULOCYTES # BLD AUTO: 0.01 K/UL (ref 0–0.11)
IMM GRANULOCYTES NFR BLD AUTO: 0.2 % (ref 0–0.9)
LYMPHOCYTES # BLD AUTO: 1.53 K/UL (ref 1–4.8)
LYMPHOCYTES NFR BLD: 30.7 % (ref 22–41)
MAGNESIUM SERPL-MCNC: 2.1 MG/DL (ref 1.5–2.5)
MCH RBC QN AUTO: 29.6 PG (ref 27–33)
MCHC RBC AUTO-ENTMCNC: 33 G/DL (ref 33.6–35)
MCV RBC AUTO: 89.7 FL (ref 81.4–97.8)
MONOCYTES # BLD AUTO: 0.38 K/UL (ref 0–0.85)
MONOCYTES NFR BLD AUTO: 7.6 % (ref 0–13.4)
NEUTROPHILS # BLD AUTO: 2.91 K/UL (ref 2–7.15)
NEUTROPHILS NFR BLD: 58.5 % (ref 44–72)
NRBC # BLD AUTO: 0 K/UL
NRBC BLD-RTO: 0 /100 WBC
PHOSPHATE SERPL-MCNC: 3.6 MG/DL (ref 2.5–4.5)
PLATELET # BLD AUTO: 222 K/UL (ref 164–446)
PMV BLD AUTO: 9.6 FL (ref 9–12.9)
POTASSIUM SERPL-SCNC: 4 MMOL/L (ref 3.6–5.5)
PROT SERPL-MCNC: 6.4 G/DL (ref 6–8.2)
RBC # BLD AUTO: 3.68 M/UL (ref 4.2–5.4)
SODIUM SERPL-SCNC: 138 MMOL/L (ref 135–145)
WBC # BLD AUTO: 5 K/UL (ref 4.8–10.8)

## 2018-08-16 PROCEDURE — 700102 HCHG RX REV CODE 250 W/ 637 OVERRIDE(OP): Performed by: SURGERY

## 2018-08-16 PROCEDURE — 84100 ASSAY OF PHOSPHORUS: CPT

## 2018-08-16 PROCEDURE — 83735 ASSAY OF MAGNESIUM: CPT

## 2018-08-16 PROCEDURE — 80053 COMPREHEN METABOLIC PANEL: CPT

## 2018-08-16 PROCEDURE — 85025 COMPLETE CBC W/AUTO DIFF WBC: CPT

## 2018-08-16 PROCEDURE — A9270 NON-COVERED ITEM OR SERVICE: HCPCS | Performed by: SURGERY

## 2018-08-16 PROCEDURE — 71045 X-RAY EXAM CHEST 1 VIEW: CPT

## 2018-08-16 PROCEDURE — 770006 HCHG ROOM/CARE - MED/SURG/GYN SEMI*

## 2018-08-16 PROCEDURE — 700111 HCHG RX REV CODE 636 W/ 250 OVERRIDE (IP): Performed by: SURGERY

## 2018-08-16 PROCEDURE — 700112 HCHG RX REV CODE 229: Performed by: SURGERY

## 2018-08-16 PROCEDURE — 99233 SBSQ HOSP IP/OBS HIGH 50: CPT | Performed by: SURGERY

## 2018-08-16 RX ADMIN — DOCUSATE SODIUM 100 MG: 100 CAPSULE, LIQUID FILLED ORAL at 17:17

## 2018-08-16 RX ADMIN — Medication 1 TABLET: at 17:17

## 2018-08-16 RX ADMIN — ACETAMINOPHEN 650 MG: 325 TABLET, FILM COATED ORAL at 17:17

## 2018-08-16 RX ADMIN — ACETAMINOPHEN 650 MG: 325 TABLET, FILM COATED ORAL at 12:27

## 2018-08-16 RX ADMIN — IRON SUCROSE 200 MG: 20 INJECTION, SOLUTION INTRAVENOUS at 05:06

## 2018-08-16 RX ADMIN — GABAPENTIN 100 MG: 100 CAPSULE ORAL at 05:06

## 2018-08-16 RX ADMIN — GABAPENTIN 100 MG: 100 CAPSULE ORAL at 16:32

## 2018-08-16 RX ADMIN — ACETAMINOPHEN 650 MG: 325 TABLET, FILM COATED ORAL at 23:37

## 2018-08-16 RX ADMIN — ACETAMINOPHEN 650 MG: 325 TABLET, FILM COATED ORAL at 05:06

## 2018-08-16 RX ADMIN — ENOXAPARIN SODIUM 30 MG: 100 INJECTION SUBCUTANEOUS at 17:17

## 2018-08-16 RX ADMIN — GABAPENTIN 100 MG: 100 CAPSULE ORAL at 23:37

## 2018-08-16 RX ADMIN — ENOXAPARIN SODIUM 30 MG: 100 INJECTION SUBCUTANEOUS at 05:16

## 2018-08-16 ASSESSMENT — ENCOUNTER SYMPTOMS
FEVER: 0
ABDOMINAL PAIN: 0
NAUSEA: 0
MYALGIAS: 0
SHORTNESS OF BREATH: 1
SPEECH CHANGE: 0

## 2018-08-16 ASSESSMENT — PAIN SCALES - GENERAL
PAINLEVEL_OUTOF10: 3
PAINLEVEL_OUTOF10: 3
PAINLEVEL_OUTOF10: 5
PAINLEVEL_OUTOF10: 2
PAINLEVEL_OUTOF10: 3
PAINLEVEL_OUTOF10: 2
PAINLEVEL_OUTOF10: 2
PAINLEVEL_OUTOF10: 3

## 2018-08-16 NOTE — CARE PLAN
Problem: Respiratory:  Goal: Respiratory status will improve  Outcome: PROGRESSING AS EXPECTED  Patient utilizing incentive spirometer, pulling [phone]. Patient is self motivated. Encouraging cough and deep breathe, incentive spirometry, and mobility to facilitate lung expansion.     Problem: Pain Management  Goal: Pain level will decrease to patient's comfort goal  Outcome: PROGRESSING AS EXPECTED  Assessing pain q2hrs. Assisting patient to rest and reposition for comfort. Patient pain well controlled with scheduled meds. Ice pack applied to shoulder to alleviate discomfort.
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
## 2018-08-16 NOTE — PROGRESS NOTES
Pt to T414-2 with transport in w/c with all belongings. VSS. All questions and concerns addressed at this time. Report given to Low ROBLES.

## 2018-08-16 NOTE — DISCHARGE PLANNING
Anticipated Discharge Disposition: Home    Action: LSW received call from Keila ROBLES CM with [employer] [phone] who is pt's insurance provider. Per Keila, she spoke with the pt earlier who stated that she does want to return to [place] but MD will not allow her to fly for 6-8 weeks. Keila asked that this LSW verify this with pt. LSW spoke with pt at bedside who stated that she rents an apartment in [place], LSW inquired about why yesterday pt stated that she did NOT want to go back to [place] and pt stated that she thought she could only go back if she had a house and was confused.     Pt may d/c home instead of SNF placement. Keila with [employer] stated that she's unsure if they want to bring pt back in network if she will just d/c home. Keila stated that she is going to staff this case with her team as they have never encountered this issue before. LSW updated Keila that pt does have transfer orders for [facility]. Keila stated she may call back and get ext for CM down there, depending on how meeting with team goes. LSW inquired about prescription coverage for pt if she d/c's home with prescriptions. Keila satetd that she will let this SW know how prescription coverage works in [place].    Barriers to Discharge: Out of network insurance     Plan: Follow for medical clearance and d/c needs.     ADD @6302: LSW received follow up call from Keila with [employer] who stated that if pt needs any prescriptions that they will need to be sent to a [facility]. Pt's insurance will only cover prescriptions at [facility].

## 2018-08-17 ENCOUNTER — APPOINTMENT (OUTPATIENT)
Dept: RADIOLOGY | Facility: MEDICAL CENTER | Age: 48
DRG: 964 | End: 2018-08-17
Attending: SURGERY
Payer: OTHER MISCELLANEOUS

## 2018-08-17 PROCEDURE — 71045 X-RAY EXAM CHEST 1 VIEW: CPT

## 2018-08-17 PROCEDURE — 700111 HCHG RX REV CODE 636 W/ 250 OVERRIDE (IP): Performed by: SURGERY

## 2018-08-17 PROCEDURE — 700101 HCHG RX REV CODE 250: Performed by: SURGERY

## 2018-08-17 PROCEDURE — A9270 NON-COVERED ITEM OR SERVICE: HCPCS | Performed by: SURGERY

## 2018-08-17 PROCEDURE — 770006 HCHG ROOM/CARE - MED/SURG/GYN SEMI*

## 2018-08-17 PROCEDURE — 700112 HCHG RX REV CODE 229: Performed by: SURGERY

## 2018-08-17 PROCEDURE — 700102 HCHG RX REV CODE 250 W/ 637 OVERRIDE(OP): Performed by: SURGERY

## 2018-08-17 RX ADMIN — OXYCODONE HYDROCHLORIDE 5 MG: 5 TABLET ORAL at 22:47

## 2018-08-17 RX ADMIN — OXYCODONE HYDROCHLORIDE 5 MG: 5 TABLET ORAL at 15:16

## 2018-08-17 RX ADMIN — OXYCODONE HYDROCHLORIDE 5 MG: 5 TABLET ORAL at 11:54

## 2018-08-17 RX ADMIN — Medication 1 TABLET: at 17:58

## 2018-08-17 RX ADMIN — DOCUSATE SODIUM 100 MG: 100 CAPSULE, LIQUID FILLED ORAL at 17:57

## 2018-08-17 RX ADMIN — ENOXAPARIN SODIUM 30 MG: 100 INJECTION SUBCUTANEOUS at 04:58

## 2018-08-17 RX ADMIN — GABAPENTIN 100 MG: 100 CAPSULE ORAL at 15:16

## 2018-08-17 RX ADMIN — IRON SUCROSE 200 MG: 20 INJECTION, SOLUTION INTRAVENOUS at 04:58

## 2018-08-17 RX ADMIN — GABAPENTIN 100 MG: 100 CAPSULE ORAL at 04:58

## 2018-08-17 RX ADMIN — GABAPENTIN 100 MG: 100 CAPSULE ORAL at 22:46

## 2018-08-17 RX ADMIN — ACETAMINOPHEN 650 MG: 325 TABLET, FILM COATED ORAL at 04:58

## 2018-08-17 RX ADMIN — ACETAMINOPHEN 650 MG: 325 TABLET, FILM COATED ORAL at 17:55

## 2018-08-17 RX ADMIN — ACETAMINOPHEN 650 MG: 325 TABLET, FILM COATED ORAL at 11:54

## 2018-08-17 RX ADMIN — ACETAMINOPHEN 650 MG: 325 TABLET, FILM COATED ORAL at 22:46

## 2018-08-17 RX ADMIN — ENOXAPARIN SODIUM 30 MG: 100 INJECTION SUBCUTANEOUS at 17:57

## 2018-08-17 ASSESSMENT — PAIN SCALES - GENERAL
PAINLEVEL_OUTOF10: 5
PAINLEVEL_OUTOF10: 3
PAINLEVEL_OUTOF10: 7
PAINLEVEL_OUTOF10: 3
PAINLEVEL_OUTOF10: 3
PAINLEVEL_OUTOF10: 7

## 2018-08-17 ASSESSMENT — ENCOUNTER SYMPTOMS
SPEECH CHANGE: 0
FEVER: 0
VOMITING: 0
CHILLS: 0
SHORTNESS OF BREATH: 0
HEADACHES: 0
DOUBLE VISION: 0
NAUSEA: 0
ABDOMINAL PAIN: 0
MYALGIAS: 1

## 2018-08-17 NOTE — DISCHARGE PLANNING
This RN LAURA spoke with Megan at [facility].  Per Megan she received the faxed clinicals.

## 2018-08-17 NOTE — PROGRESS NOTES
Bedside report recieved.  Assessment complete.  A&O x 4, primarily speaks Chinese. Patient calls appropriately.  Patient up with stand by assist and staedy.   Patient has 3/10 pain. Declines pain medication  Denies N&V. Tolerating diet.  Scattered bruising noted throughout body.  + void, + flatus. Last BM 8/16  Patient denies SOB.  SCD's in place.  Review plan with of care with patient. Call light and personal belongings with in reach. Hourly rounding in place. All needs met at this time.

## 2018-08-17 NOTE — CARE PLAN
Problem: Infection  Goal: Will remain free from infection  Outcome: PROGRESSING AS EXPECTED  Pt exhibits no signs of infection at this time.     Problem: Respiratory:  Goal: Respiratory status will improve  Outcome: PROGRESSING SLOWER THAN EXPECTED  Still required O2 in ICU today, weaned off on unit, goal is to keep O2 sat >93%. Will continue to monitor.

## 2018-08-17 NOTE — CARE PLAN
Problem: Safety  Goal: Will remain free from injury  Outcome: PROGRESSING AS EXPECTED  Safety precautions in place. Patient up self and steady. Hourly rounding in place.     Problem: Pain Management  Goal: Pain level will decrease to patient's comfort goal  Outcome: PROGRESSING AS EXPECTED  Patient experiencing pain relief with scheduled medications. Patient encouraged to call if pain worsens. Hourly rounding in place.

## 2018-08-17 NOTE — DISCHARGE PLANNING
Anticipated Discharge Disposition: Home    Action: This TRAVIS SANCHEZ spoke with Keila ROBLES CM with [employer] (Phone Number [phone]).  Per Keila, the pt is out of network and must have her follow up at [facility] in [place].  The pt is aware of this.  This RN CM sent clinicals to Megan at [facility] (fax # [phone]) and (phone number [phone]).  Per Keila ROBLES CM the pt has to fill her prescriptions at a [facility] pharmacy only.  The pt's  and pt state they understand and their apartment here in [place] is right down the street from [facility].    Barriers to Discharge: none    Plan: Per the pt she will follow up in [place] and she knows she cannot fly but must drive.

## 2018-08-17 NOTE — PROGRESS NOTES
Report received from Day RN.   Assessment complete.  A&O x 4, primarily speaks Chinese. Patient calls appropriately.  Patient up with stand by assist and staedy.   Patient has 3/10 pain. Declines pain medication  Denies N&V. Tolerating diet.  Scattered bruising noted throughout body.  + void, + flatus. Last BM 8/16  Patient denies SOB.  SCD's in place.  Review plan with of care with patient. Call light and personal belongings with in reach. Hourly rounding in place. All needs met at this time.

## 2018-08-17 NOTE — PROGRESS NOTES
Report received from Amelia in ICU.  Assessment complete.  A&O x 4, primarily speaks Chinese. Patient calls appropriately.  Patient up with stand by assist and staedy.   Patient has 3/10 pain. Declines pain medication  Denies N&V. Tolerating diet.  Scattered bruising noted throughout body.  + void, + flatus. Last BM 8/16  Patient denies SOB.  SCD's in place.  Review plan with of care with patient. Call light and personal belongings with in reach. Hourly rounding in place. All needs met at this time.
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
## 2018-08-18 ENCOUNTER — APPOINTMENT (OUTPATIENT)
Dept: RADIOLOGY | Facility: MEDICAL CENTER | Age: 48
DRG: 964 | End: 2018-08-18
Attending: INTERNAL MEDICINE
Payer: OTHER MISCELLANEOUS

## 2018-08-18 ENCOUNTER — APPOINTMENT (OUTPATIENT)
Dept: RADIOLOGY | Facility: MEDICAL CENTER | Age: 48
DRG: 964 | End: 2018-08-18
Attending: SURGERY
Payer: OTHER MISCELLANEOUS

## 2018-08-18 PROBLEM — J90 PLEURAL EFFUSION: Status: ACTIVE | Noted: 2018-08-18

## 2018-08-18 LAB
BASOPHILS # BLD AUTO: 1.4 % (ref 0–1.8)
BASOPHILS # BLD: 0.07 K/UL (ref 0–0.12)
CFT BLD TEG: 6.9 MIN (ref 5–10)
CLOT ANGLE BLD TEG: 62.4 DEGREES (ref 53–72)
CLOT LYSIS 30M P MA LENFR BLD TEG: 3.9 % (ref 0–8)
CT.EXTRINSIC BLD ROTEM: 2 MIN (ref 1–3)
EOSINOPHIL # BLD AUTO: 0.09 K/UL (ref 0–0.51)
EOSINOPHIL NFR BLD: 1.8 % (ref 0–6.9)
ERYTHROCYTE [DISTWIDTH] IN BLOOD BY AUTOMATED COUNT: 43 FL (ref 35.9–50)
HCT VFR BLD AUTO: 31.1 % (ref 37–47)
HGB BLD-MCNC: 10.4 G/DL (ref 12–16)
IMM GRANULOCYTES # BLD AUTO: 0.02 K/UL (ref 0–0.11)
IMM GRANULOCYTES NFR BLD AUTO: 0.4 % (ref 0–0.9)
LYMPHOCYTES # BLD AUTO: 2.33 K/UL (ref 1–4.8)
LYMPHOCYTES NFR BLD: 46 % (ref 22–41)
MCF BLD TEG: 64.7 MM (ref 50–70)
MCH RBC QN AUTO: 30.1 PG (ref 27–33)
MCHC RBC AUTO-ENTMCNC: 33.4 G/DL (ref 33.6–35)
MCV RBC AUTO: 89.9 FL (ref 81.4–97.8)
MONOCYTES # BLD AUTO: 0.36 K/UL (ref 0–0.85)
MONOCYTES NFR BLD AUTO: 7.1 % (ref 0–13.4)
NEUTROPHILS # BLD AUTO: 2.2 K/UL (ref 2–7.15)
NEUTROPHILS NFR BLD: 43.3 % (ref 44–72)
NRBC # BLD AUTO: 0 K/UL
NRBC BLD-RTO: 0 /100 WBC
PA AA BLD-ACNC: 0 %
PA ADP BLD-ACNC: 0 %
PLATELET # BLD AUTO: 242 K/UL (ref 164–446)
PMV BLD AUTO: 9.1 FL (ref 9–12.9)
RBC # BLD AUTO: 3.46 M/UL (ref 4.2–5.4)
TEG ALGORITHM TGALG: NORMAL
WBC # BLD AUTO: 5.1 K/UL (ref 4.8–10.8)

## 2018-08-18 PROCEDURE — 36415 COLL VENOUS BLD VENIPUNCTURE: CPT

## 2018-08-18 PROCEDURE — 85025 COMPLETE CBC W/AUTO DIFF WBC: CPT

## 2018-08-18 PROCEDURE — 85347 COAGULATION TIME ACTIVATED: CPT

## 2018-08-18 PROCEDURE — 770006 HCHG ROOM/CARE - MED/SURG/GYN SEMI*

## 2018-08-18 PROCEDURE — 85384 FIBRINOGEN ACTIVITY: CPT

## 2018-08-18 PROCEDURE — 700111 HCHG RX REV CODE 636 W/ 250 OVERRIDE (IP): Performed by: SURGERY

## 2018-08-18 PROCEDURE — 32555 ASPIRATE PLEURA W/ IMAGING: CPT | Mod: RT

## 2018-08-18 PROCEDURE — 71045 X-RAY EXAM CHEST 1 VIEW: CPT

## 2018-08-18 PROCEDURE — 85576 BLOOD PLATELET AGGREGATION: CPT | Mod: 91

## 2018-08-18 PROCEDURE — A9270 NON-COVERED ITEM OR SERVICE: HCPCS | Performed by: SURGERY

## 2018-08-18 PROCEDURE — 0W993ZZ DRAINAGE OF RIGHT PLEURAL CAVITY, PERCUTANEOUS APPROACH: ICD-10-PCS | Performed by: RADIOLOGY

## 2018-08-18 PROCEDURE — 700102 HCHG RX REV CODE 250 W/ 637 OVERRIDE(OP): Performed by: SURGERY

## 2018-08-18 RX ADMIN — ONDANSETRON 4 MG: 2 INJECTION, SOLUTION INTRAMUSCULAR; INTRAVENOUS at 14:57

## 2018-08-18 RX ADMIN — GABAPENTIN 100 MG: 100 CAPSULE ORAL at 20:44

## 2018-08-18 RX ADMIN — ACETAMINOPHEN 650 MG: 325 TABLET, FILM COATED ORAL at 17:17

## 2018-08-18 RX ADMIN — OXYCODONE HYDROCHLORIDE 5 MG: 5 TABLET ORAL at 12:55

## 2018-08-18 RX ADMIN — IRON SUCROSE 200 MG: 20 INJECTION, SOLUTION INTRAVENOUS at 05:24

## 2018-08-18 RX ADMIN — ACETAMINOPHEN 650 MG: 325 TABLET, FILM COATED ORAL at 12:55

## 2018-08-18 RX ADMIN — ENOXAPARIN SODIUM 30 MG: 100 INJECTION SUBCUTANEOUS at 17:17

## 2018-08-18 RX ADMIN — GABAPENTIN 100 MG: 100 CAPSULE ORAL at 12:55

## 2018-08-18 ASSESSMENT — PAIN SCALES - GENERAL
PAINLEVEL_OUTOF10: 4
PAINLEVEL_OUTOF10: 6
PAINLEVEL_OUTOF10: 4
PAINLEVEL_OUTOF10: 4

## 2018-08-18 ASSESSMENT — ENCOUNTER SYMPTOMS
DOUBLE VISION: 0
FEVER: 0
HEADACHES: 0
VOMITING: 0
CHILLS: 0
SPEECH CHANGE: 0
SHORTNESS OF BREATH: 0
ABDOMINAL PAIN: 0
MYALGIAS: 1
NAUSEA: 0

## 2018-08-18 NOTE — PROGRESS NOTES
THORACENTESIS RT DONE IN US    PT RETURNED TO ROOM IN STABLE CONDITION      /62    95/53    500CC REMOVED    NO LAB ORDERS FOR FLUID

## 2018-08-18 NOTE — PROGRESS NOTES
Bedside report recieved.  Assessment complete.  A&O x 4, primarily speaks Chinese. Patient calls appropriately.  Patient up with stand by assist and steady.   Patient has 4/10 pain. Declines pain medication  Denies N&V. Tolerating diet.  Scattered bruising noted throughout body.  + void, + flatus. Last BM 8/17  Patient denies SOB.  SCD's in place.  Son at bedside  Review plan with of care with patient. Call light and personal belongings with in reach. Hourly rounding in place. All needs met at this time.

## 2018-08-18 NOTE — PROGRESS NOTES
Bedside report recieved.  Assessment complete.  A&O x 4, primarily speaks Chinese. Patient calls appropriately.  Patient up with stand by assist and steady.   Patient has 3/10 pain. Declines pain medication  Denies N&V. Tolerating diet.  Scattered bruising noted throughout body.  + void, + flatus. Last BM 8/17  Patient denies SOB.  SCD's in place. NPO after midnight for thoracentesis tomorrow.   Review plan with of care with patient. Call light and personal belongings with in reach. Hourly rounding in place. All needs met at this time.

## 2018-08-18 NOTE — CARE PLAN
Problem: Communication  Goal: The ability to communicate needs accurately and effectively will improve  Outcome: PROGRESSING AS EXPECTED  Pt able to communicate needs appropriately    Problem: Discharge Barriers/Planning  Goal: Patient's continuum of care needs will be met  Outcome: PROGRESSING AS EXPECTED  Pt having thoracentesis in AM, seems to understand benefits of procedure in her case.

## 2018-08-18 NOTE — OR SURGEON
Immediate Post- Operative Note        PostOp Diagnosis: Right pleural effusion      Procedure(s): US guided Right thoracentesis      Estimated Blood Loss: Less than 5 ml        Complications: None            8/18/2018     4:18 PM     Gordon Hou
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
## 2018-08-19 ENCOUNTER — APPOINTMENT (OUTPATIENT)
Dept: RADIOLOGY | Facility: MEDICAL CENTER | Age: 48
DRG: 964 | End: 2018-08-19
Attending: NURSE PRACTITIONER
Payer: OTHER MISCELLANEOUS

## 2018-08-19 VITALS
HEIGHT: 68 IN | WEIGHT: 144.18 LBS | SYSTOLIC BLOOD PRESSURE: 103 MMHG | TEMPERATURE: 99.2 F | RESPIRATION RATE: 18 BRPM | OXYGEN SATURATION: 95 % | HEART RATE: 73 BPM | DIASTOLIC BLOOD PRESSURE: 58 MMHG | BODY MASS INDEX: 21.85 KG/M2

## 2018-08-19 LAB
ANION GAP SERPL CALC-SCNC: 8 MMOL/L (ref 0–11.9)
BASOPHILS # BLD AUTO: 2.6 % (ref 0–1.8)
BASOPHILS # BLD: 0.16 K/UL (ref 0–0.12)
BUN SERPL-MCNC: 11 MG/DL (ref 8–22)
CALCIUM SERPL-MCNC: 8.8 MG/DL (ref 8.5–10.5)
CHLORIDE SERPL-SCNC: 105 MMOL/L (ref 96–112)
CO2 SERPL-SCNC: 24 MMOL/L (ref 20–33)
CREAT SERPL-MCNC: 0.53 MG/DL (ref 0.5–1.4)
EOSINOPHIL # BLD AUTO: 0.06 K/UL (ref 0–0.51)
EOSINOPHIL NFR BLD: 0.9 % (ref 0–6.9)
ERYTHROCYTE [DISTWIDTH] IN BLOOD BY AUTOMATED COUNT: 42 FL (ref 35.9–50)
GLUCOSE SERPL-MCNC: 108 MG/DL (ref 65–99)
HCT VFR BLD AUTO: 33.1 % (ref 37–47)
HGB BLD-MCNC: 10.8 G/DL (ref 12–16)
LYMPHOCYTES # BLD AUTO: 2.23 K/UL (ref 1–4.8)
LYMPHOCYTES NFR BLD: 35.4 % (ref 22–41)
MANUAL DIFF BLD: NORMAL
MCH RBC QN AUTO: 29.2 PG (ref 27–33)
MCHC RBC AUTO-ENTMCNC: 32.6 G/DL (ref 33.6–35)
MCV RBC AUTO: 89.5 FL (ref 81.4–97.8)
MONOCYTES # BLD AUTO: 0.28 K/UL (ref 0–0.85)
MONOCYTES NFR BLD AUTO: 4.4 % (ref 0–13.4)
MORPHOLOGY BLD-IMP: NORMAL
NEUTROPHILS # BLD AUTO: 3.46 K/UL (ref 2–7.15)
NEUTROPHILS NFR BLD: 54.9 % (ref 44–72)
NRBC # BLD AUTO: 0 K/UL
NRBC BLD-RTO: 0 /100 WBC
PLATELET # BLD AUTO: 295 K/UL (ref 164–446)
PLATELET BLD QL SMEAR: NORMAL
PMV BLD AUTO: 8.9 FL (ref 9–12.9)
POTASSIUM SERPL-SCNC: 4 MMOL/L (ref 3.6–5.5)
RBC # BLD AUTO: 3.7 M/UL (ref 4.2–5.4)
RBC BLD AUTO: NORMAL
SODIUM SERPL-SCNC: 137 MMOL/L (ref 135–145)
WBC # BLD AUTO: 6.3 K/UL (ref 4.8–10.8)

## 2018-08-19 PROCEDURE — 73030 X-RAY EXAM OF SHOULDER: CPT | Mod: RT

## 2018-08-19 PROCEDURE — 700101 HCHG RX REV CODE 250: Performed by: SURGERY

## 2018-08-19 PROCEDURE — A9270 NON-COVERED ITEM OR SERVICE: HCPCS | Performed by: SURGERY

## 2018-08-19 PROCEDURE — 700111 HCHG RX REV CODE 636 W/ 250 OVERRIDE (IP): Performed by: SURGERY

## 2018-08-19 PROCEDURE — 36415 COLL VENOUS BLD VENIPUNCTURE: CPT

## 2018-08-19 PROCEDURE — 71046 X-RAY EXAM CHEST 2 VIEWS: CPT

## 2018-08-19 PROCEDURE — 85027 COMPLETE CBC AUTOMATED: CPT

## 2018-08-19 PROCEDURE — 80048 BASIC METABOLIC PNL TOTAL CA: CPT

## 2018-08-19 PROCEDURE — 700102 HCHG RX REV CODE 250 W/ 637 OVERRIDE(OP): Performed by: SURGERY

## 2018-08-19 PROCEDURE — 85007 BL SMEAR W/DIFF WBC COUNT: CPT

## 2018-08-19 RX ORDER — BACITRACIN ZINC AND POLYMYXIN B SULFATE 500; 1000 [USP'U]/G; [USP'U]/G
1 OINTMENT TOPICAL 3 TIMES DAILY
Qty: 1 TUBE | Refills: 0 | Status: SHIPPED | OUTPATIENT
Start: 2018-08-19

## 2018-08-19 RX ORDER — GABAPENTIN 100 MG/1
100 CAPSULE ORAL EVERY 8 HOURS
Qty: 15 CAP | Refills: 0 | Status: SHIPPED | OUTPATIENT
Start: 2018-08-19 | End: 2018-08-24

## 2018-08-19 RX ADMIN — ACETAMINOPHEN 650 MG: 325 TABLET, FILM COATED ORAL at 11:43

## 2018-08-19 RX ADMIN — GABAPENTIN 100 MG: 100 CAPSULE ORAL at 15:26

## 2018-08-19 RX ADMIN — ENOXAPARIN SODIUM 30 MG: 100 INJECTION SUBCUTANEOUS at 05:43

## 2018-08-19 RX ADMIN — Medication 1 EACH: at 11:43

## 2018-08-19 RX ADMIN — GABAPENTIN 100 MG: 100 CAPSULE ORAL at 05:43

## 2018-08-19 RX ADMIN — ACETAMINOPHEN 650 MG: 325 TABLET, FILM COATED ORAL at 05:43

## 2018-08-19 ASSESSMENT — ENCOUNTER SYMPTOMS
SHORTNESS OF BREATH: 0
MYALGIAS: 1
DOUBLE VISION: 0
HEADACHES: 0
CHILLS: 0
ABDOMINAL PAIN: 0
SPEECH CHANGE: 0
NAUSEA: 0
VOMITING: 0
FEVER: 0

## 2018-08-19 ASSESSMENT — PAIN SCALES - GENERAL
PAINLEVEL_OUTOF10: 3

## 2018-08-19 NOTE — CARE PLAN
Problem: Bowel/Gastric:  Goal: Will not experience complications related to bowel motility  Outcome: PROGRESSING AS EXPECTED  Patient having regular BMs. Refusing bowel meds    Problem: Knowledge Deficit  Goal: Knowledge of the prescribed therapeutic regimen will improve  Outcome: PROGRESSING AS EXPECTED  Medications discussed prior to administration. Questions answered

## 2018-08-19 NOTE — PROGRESS NOTES
Pt AAOx4.  C/o pain at 3/10, declining intervention at this time.  Generalized bruising and abrasions.  Pt ambulating unit up self.  Regular diet in place, no c/o n/v.  LBM 8/17, + flatus, + void.  Awaiting CXR results.   POC reviewed with pt.  Call light within reach, pt educated to call for assistance.  Hourly rounding in place.
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
## 2018-08-19 NOTE — DISCHARGE INSTRUCTIONS
Discharge Instructions    Discharged to home by car with relative. Discharged via wheelchair, hospital escort: Yes.  Special equipment needed: Not Applicable    Be sure to schedule a follow-up appointment with your primary care doctor or any specialists as instructed.     Discharge Plan:   Influenza Vaccine Indication: Patient Refuses    I understand that a diet low in cholesterol, fat, and sodium is recommended for good health. Unless I have been given specific instructions below for another diet, I accept this instruction as my diet prescription.   Other diet: Regular diet as tolerated.    Special Instructions:   1.  Call or seek medical attention if questions or concerns arise   2.  Follow up with Dr. Fuad Muro, trauma surgery within one weeks time, as needed, if symptoms worsen, and with follow up 2-view CXR   3.  Follow up with Dr. Joaquin Urrutia Jr., facial fractures within one weeks time   4.  No contact sports, heavy lifting, or strenuous activities until cleared by outpatient provider   5.  No swimming, hot tubs, baths or wound submersion. May shower   6.  May take over the counter tylenol and/or nonsteroidal antiinfammatories for pain control as directed   7.  Seek immediate medical attention for signs and symptoms of infection, new/or worsening abdominal pain, and/or respiratory decompensation    · Is patient discharged on Warfarin / Coumadin?   No     Thoracentesis, Care After  Refer to this sheet in the next few weeks. These instructions provide you with information about caring for yourself after your procedure. Your health care provider may also give you more specific instructions. Your treatment has been planned according to current medical practices, but problems sometimes occur. Call your health care provider if you have any problems or questions after your procedure.  WHAT TO EXPECT AFTER THE PROCEDURE  After your procedure, it is common to have pain at the puncture site.  HOME CARE  INSTRUCTIONS  · Take medicines only as directed by your health care provider.  · You may return to your normal diet and normal activities as directed by your health care provider.  · Drink enough fluid to keep your urine clear or pale yellow.  · Do not take baths, swim, or use a hot tub until your health care provider approves.  · Follow your health care provider's instructions about:  ¨ Puncture site care.  ¨ Bandage (dressing) changes and removal.  · Check your puncture site every day for signs of infection. Watch for:  ¨ Redness, swelling, or pain.  ¨ Fluid, blood, or pus.  · Keep all follow-up visits as directed by your health care provider. This is important.  SEEK MEDICAL CARE IF:  · You have redness, swelling, or pain at your puncture site.  · You have fluid, blood, or pus coming from your puncture site.  · You have a fever.  · You have chills.  · You have nausea or vomiting.  · You have trouble breathing.  · You develop a worsening cough.  SEEK IMMEDIATE MEDICAL CARE IF:  · You have extreme shortness of breath.  · You develop chest pain.  · You faint or feel light-headed.     This information is not intended to replace advice given to you by your health care provider. Make sure you discuss any questions you have with your health care provider.     Document Released: 01/08/2016 Document Reviewed: 01/08/2016  [employer] Interactive Patient Education ©2016 [employer]      Depression / Suicide Risk    As you are discharged from this [facility] facility, it is important to learn how to keep safe from harming yourself.    Recognize the warning signs:  · Abrupt changes in personality, positive or negative- including increase in energy   · Giving away possessions  · Change in eating patterns- significant weight changes-  positive or negative  · Change in sleeping patterns- unable to sleep or sleeping all the time   · Unwillingness or inability to communicate  · Depression  · Unusual sadness, discouragement and  loneliness  · Talk of wanting to die  · Neglect of personal appearance   · Rebelliousness- reckless behavior  · Withdrawal from people/activities they love  · Confusion- inability to concentrate     If you or a loved one observes any of these behaviors or has concerns about self-harm, here's what you can do:  · Talk about it- your feelings and reasons for harming yourself  · Remove any means that you might use to hurt yourself (examples: pills, rope, extension cords, firearm)  · Get professional help from the community (Mental Health, Substance Abuse, psychological counseling)  · Do not be alone:Call your Safe Contact- someone whom you trust who will be there for you.  · Call your local CRISIS HOTLINE 064-0669 or 767-431-7225  · Call your local Children's Mobile Crisis Response Team [place] (576) 798-4309 or www.[employer]  · Call the toll free National Suicide Prevention Hotlines   · National Suicide Prevention Lifeline 318-214-YCLP (7095)  · National Hope Line Network 800-SUICIDE (119-7286)

## 2018-08-19 NOTE — PROGRESS NOTES
Discharge instructions reviewed with pt.  Son present at bedside.   Prescriptions for medications and CXR given to pt.   Explained to pt to get CXR within 48 hours of follow up appointment and number provided to pt.   All questions answered.  PIV removed.  Pt escorted off unit with staff to discharge home with son.

## 2018-08-19 NOTE — PROGRESS NOTES
Received bedside report and assumed care at 1630    Pt is A&O x4  Pain 4/10, declines pain medication, heat packs in use  Denies nausea  Tolerating regular diet  + Urine Output  + Flatus  + BM   Scattered bruising  Up self   Bed in lowest position and locked.  Reviewed plan of care with patient, bed in lowest position and locked, pt resting comfortably now, call light within reach, all needs met at this time1

## 2018-08-19 NOTE — CARE PLAN
Problem: Communication  Goal: The ability to communicate needs accurately and effectively will improve  Outcome: PROGRESSING AS EXPECTED  Pt with slight language barrier. Pt able to speak English well, no  services needed at this time.     Problem: Discharge Barriers/Planning  Goal: Patient's continuum of care needs will be met  Outcome: PROGRESSING AS EXPECTED  Pt to discharge home today.

## 2021-05-25 ENCOUNTER — HOSPITAL ENCOUNTER (OUTPATIENT)
Dept: RADIOLOGY | Facility: MEDICAL CENTER | Age: 51
End: 2021-05-25
Attending: FAMILY MEDICINE
Payer: COMMERCIAL

## 2021-05-25 DIAGNOSIS — Z12.31 VISIT FOR SCREENING MAMMOGRAM: ICD-10-CM

## 2021-05-25 PROCEDURE — 77063 BREAST TOMOSYNTHESIS BI: CPT
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
